# Patient Record
Sex: FEMALE | Race: WHITE | NOT HISPANIC OR LATINO | Employment: FULL TIME | RURAL
[De-identification: names, ages, dates, MRNs, and addresses within clinical notes are randomized per-mention and may not be internally consistent; named-entity substitution may affect disease eponyms.]

---

## 2019-09-27 ENCOUNTER — OFFICE VISIT (OUTPATIENT)
Dept: FAMILY MEDICINE CLINIC | Facility: CLINIC | Age: 29
End: 2019-09-27

## 2019-09-27 VITALS
TEMPERATURE: 97.4 F | HEIGHT: 64 IN | WEIGHT: 131 LBS | RESPIRATION RATE: 18 BRPM | BODY MASS INDEX: 22.36 KG/M2 | SYSTOLIC BLOOD PRESSURE: 104 MMHG | OXYGEN SATURATION: 98 % | DIASTOLIC BLOOD PRESSURE: 66 MMHG | HEART RATE: 88 BPM

## 2019-09-27 DIAGNOSIS — F41.9 ANXIETY: Primary | ICD-10-CM

## 2019-09-27 DIAGNOSIS — J30.1 SEASONAL ALLERGIC RHINITIS DUE TO POLLEN: ICD-10-CM

## 2019-09-27 DIAGNOSIS — M79.671 HEEL PAIN, BILATERAL: ICD-10-CM

## 2019-09-27 DIAGNOSIS — M79.672 HEEL PAIN, BILATERAL: ICD-10-CM

## 2019-09-27 PROBLEM — T78.40XA ALLERGIC DRUG REACTION: Status: ACTIVE | Noted: 2019-09-27

## 2019-09-27 PROBLEM — N32.89: Status: ACTIVE | Noted: 2019-09-27

## 2019-09-27 PROBLEM — F81.89 OTHER SPECIFIC DEVELOPMENTAL LEARNING DIFFICULTIES: Status: ACTIVE | Noted: 2019-09-27

## 2019-09-27 PROBLEM — F43.0 ACUTE REACTION TO STRESS: Status: ACTIVE | Noted: 2019-09-27

## 2019-09-27 PROBLEM — R79.82 ELEVATED C-REACTIVE PROTEIN: Status: ACTIVE | Noted: 2019-09-27

## 2019-09-27 PROBLEM — T78.40XA ALLERGIC DRUG REACTION: Status: RESOLVED | Noted: 2019-09-27 | Resolved: 2019-09-27

## 2019-09-27 PROBLEM — N13.70 VESICOURETERAL REFLUX: Status: ACTIVE | Noted: 2019-09-27

## 2019-09-27 PROBLEM — N60.19 FIBROCYSTIC BREAST DISEASE: Status: ACTIVE | Noted: 2019-09-27

## 2019-09-27 PROCEDURE — 99214 OFFICE O/P EST MOD 30 MIN: CPT | Performed by: FAMILY MEDICINE

## 2019-09-27 RX ORDER — BUSPIRONE HYDROCHLORIDE 7.5 MG/1
7.5 TABLET ORAL TAKE AS DIRECTED
Qty: 53 TABLET | Refills: 1 | Status: SHIPPED | OUTPATIENT
Start: 2019-09-27 | End: 2019-11-08 | Stop reason: SDUPTHER

## 2019-09-27 RX ORDER — BUSPIRONE HYDROCHLORIDE 7.5 MG/1
7.5 TABLET ORAL TAKE AS DIRECTED
Qty: 53 TABLET | Refills: 0 | Status: SHIPPED | OUTPATIENT
Start: 2019-09-27 | End: 2019-09-27 | Stop reason: SDUPTHER

## 2019-09-27 NOTE — PROGRESS NOTES
Subjective   Evangelist Mosher is a 29 y.o. female.     Chief Complaint   Patient presents with   • Anxiety   • Foot Pain     bilateral heel pain for years       Anxiety   Presents for follow-up visit. Symptoms include nervous/anxious behavior. Patient reports no chest pain, depressed mood, nausea or shortness of breath. Primary symptoms comment: in nursing school. Symptoms occur most days. The severity of symptoms is interfering with daily activities. The quality of sleep is good. Nighttime awakenings: none.       Foot Pain   This is a chronic problem. The current episode started more than 1 year ago. The problem occurs constantly. The problem has been unchanged. Pertinent negatives include no abdominal pain, arthralgias, chest pain, coughing, fever, nausea, rash or vomiting. The symptoms are aggravated by walking. She has tried nothing for the symptoms.        The following portions of the patient's history were reviewed and updated as appropriate: allergies, current medications, past family history, past medical history, past social history, past surgical history and problem list.    Allergies:  Allergies   Allergen Reactions   • Ceftriaxone Hives, Itching, Rash and Swelling   • Sulfa Antibiotics Hives   • Ciprofloxacin Hives       Social History:  Social History     Socioeconomic History   • Marital status: Single     Spouse name: Not on file   • Number of children: Not on file   • Years of education: Not on file   • Highest education level: Not on file   Tobacco Use   • Smoking status: Never Smoker   • Smokeless tobacco: Never Used   Substance and Sexual Activity   • Alcohol use: No     Frequency: Never   • Drug use: No       Family History:  Family History   Problem Relation Age of Onset   • Diabetes Maternal Grandfather         mellitus   • Heart disease Maternal Grandfather         ischemic   • Cancer Paternal Grandfather    • Heart disease Paternal Grandfather         ischemic   • Bipolar disorder Cousin   "      Past Medical History :  Patient Active Problem List   Diagnosis   • Acute reaction to stress   • Anxiety   • Elevated C-reactive protein   • Fibrocystic breast disease   • History of varicella   • Other bladder disorder   • Other specific developmental learning difficulties   • Seasonal allergic rhinitis due to pollen   • Vesicoureteral reflux       Medication List:    Current Outpatient Medications:   •  busPIRone (BUSPAR) 7.5 MG tablet, Take 1 tablet by mouth Take As Directed. 1 daily for 7 days, then BID., Disp: 53 tablet, Rfl: 1    Past Surgical History:  History reviewed. No pertinent surgical history.    Review of Systems:  Review of Systems   Constitutional: Negative for activity change and fever.   HENT: Negative for ear pain, rhinorrhea, sinus pressure and voice change.    Eyes: Negative for visual disturbance.   Respiratory: Negative for cough and shortness of breath.    Cardiovascular: Negative for chest pain.   Gastrointestinal: Negative for abdominal pain, diarrhea, nausea and vomiting.   Endocrine: Negative for cold intolerance and heat intolerance.   Genitourinary: Negative for frequency and urgency.   Musculoskeletal: Negative for arthralgias.   Skin: Negative for rash.   Neurological: Negative for syncope.   Hematological: Does not bruise/bleed easily.   Psychiatric/Behavioral: Negative for depressed mood. The patient is nervous/anxious.        Physical Exam:  Vital Signs:  Visit Vitals  /66   Pulse 88   Temp 97.4 °F (36.3 °C)   Resp 18   Ht 161.9 cm (63.75\")   Wt 59.4 kg (131 lb)   LMP 09/21/2019   SpO2 98%   BMI 22.66 kg/m²       Physical Exam   Constitutional: She is oriented to person, place, and time. She appears well-developed and well-nourished. She is cooperative.   Cardiovascular: Normal rate, regular rhythm and normal heart sounds. Exam reveals no gallop and no friction rub.   No murmur heard.  Pulmonary/Chest: Effort normal and breath sounds normal. She has no wheezes. She has " no rales.        Neurological: She is alert and oriented to person, place, and time. Coordination normal.   Skin: Skin is warm and dry.   Psychiatric: She has a normal mood and affect.   Vitals reviewed.      Assessment and Plan:  Problem List Items Addressed This Visit        Respiratory    Seasonal allergic rhinitis due to pollen    Overview     contributing to symptoms            Other    Anxiety - Primary    Relevant Medications    busPIRone (BUSPAR) 7.5 MG tablet      Other Visit Diagnoses     Heel pain, bilateral              An After Visit Summary and PPPS were given to the patient.

## 2019-11-08 ENCOUNTER — OFFICE VISIT (OUTPATIENT)
Dept: FAMILY MEDICINE CLINIC | Facility: CLINIC | Age: 29
End: 2019-11-08

## 2019-11-08 VITALS
HEART RATE: 66 BPM | SYSTOLIC BLOOD PRESSURE: 108 MMHG | TEMPERATURE: 98.6 F | BODY MASS INDEX: 23.56 KG/M2 | OXYGEN SATURATION: 98 % | DIASTOLIC BLOOD PRESSURE: 62 MMHG | RESPIRATION RATE: 18 BRPM | WEIGHT: 138 LBS | HEIGHT: 64 IN

## 2019-11-08 DIAGNOSIS — B00.1 COLD SORE: ICD-10-CM

## 2019-11-08 DIAGNOSIS — F41.9 ANXIETY: Primary | ICD-10-CM

## 2019-11-08 PROCEDURE — 99212 OFFICE O/P EST SF 10 MIN: CPT | Performed by: FAMILY MEDICINE

## 2019-11-08 RX ORDER — ACYCLOVIR 200 MG/1
200 CAPSULE ORAL
Qty: 25 CAPSULE | Refills: 1 | Status: SHIPPED | OUTPATIENT
Start: 2019-11-08 | End: 2020-09-14

## 2019-11-08 RX ORDER — BUSPIRONE HYDROCHLORIDE 7.5 MG/1
7.5 TABLET ORAL TAKE AS DIRECTED
Qty: 60 TABLET | Refills: 12 | Status: SHIPPED | OUTPATIENT
Start: 2019-11-08 | End: 2020-09-14 | Stop reason: DRUGHIGH

## 2019-11-08 NOTE — PROGRESS NOTES
Subjective   Evangelist Mosher is a 29 y.o. female.     Chief Complaint   Patient presents with   • Anxiety       Doing really good on medication.       Anxiety   Presents for follow-up visit. Symptoms include nervous/anxious behavior. Patient reports no chest pain, depressed mood, nausea or shortness of breath. Symptoms occur occasionally. The severity of symptoms is mild. The quality of sleep is good. Nighttime awakenings: occasional.     Compliance with medications is %. Treatment side effects: none.      She has a cold sore on her right upper lip. Been there a few days. Getting worse. Nothing makes it better. It tingles. She has been stressed    The following portions of the patient's history were reviewed and updated as appropriate: allergies, current medications, past family history, past medical history, past social history, past surgical history and problem list.    Allergies:  Allergies   Allergen Reactions   • Ceftriaxone Hives, Itching, Rash and Swelling   • Sulfa Antibiotics Hives   • Ciprofloxacin Hives       Social History:  Social History     Socioeconomic History   • Marital status: Single     Spouse name: Not on file   • Number of children: Not on file   • Years of education: Not on file   • Highest education level: Not on file   Tobacco Use   • Smoking status: Never Smoker   • Smokeless tobacco: Never Used   Substance and Sexual Activity   • Alcohol use: No     Frequency: Never   • Drug use: No       Family History:  Family History   Problem Relation Age of Onset   • Diabetes Maternal Grandfather         mellitus   • Heart disease Maternal Grandfather         ischemic   • Cancer Paternal Grandfather    • Heart disease Paternal Grandfather         ischemic   • Bipolar disorder Cousin        Past Medical History :  Patient Active Problem List   Diagnosis   • Acute reaction to stress   • Anxiety   • Elevated C-reactive protein   • Fibrocystic breast disease   • History of varicella   • Other  "bladder disorder   • Other specific developmental learning difficulties   • Seasonal allergic rhinitis due to pollen   • Vesicoureteral reflux       Medication List:    Current Outpatient Medications:   •  busPIRone (BUSPAR) 7.5 MG tablet, Take 1 tablet by mouth Take As Directed. 1 daily for 7 days, then BID., Disp: 60 tablet, Rfl: 12  •  acyclovir (ZOVIRAX) 200 MG capsule, Take 1 capsule by mouth Every 4 (Four) Hours While Awake. Take at first sign of recurrence., Disp: 25 capsule, Rfl: 1    Past Surgical History:  History reviewed. No pertinent surgical history.    Review of Systems:  Review of Systems   Constitutional: Negative for activity change and fever.   HENT: Negative for ear pain, rhinorrhea, sinus pressure and voice change.    Eyes: Negative for visual disturbance.   Respiratory: Negative for cough and shortness of breath.    Cardiovascular: Negative for chest pain.   Gastrointestinal: Negative for abdominal pain, diarrhea, nausea and vomiting.   Endocrine: Negative for cold intolerance and heat intolerance.   Genitourinary: Negative for frequency and urgency.   Musculoskeletal: Negative for arthralgias.   Skin: Positive for skin lesions. Negative for rash.   Neurological: Negative for syncope.   Hematological: Does not bruise/bleed easily.   Psychiatric/Behavioral: Negative for depressed mood. The patient is nervous/anxious.        Physical Exam:  Vital Signs:  Visit Vitals  /62   Pulse 66   Temp 98.6 °F (37 °C)   Resp 18   Ht 161.9 cm (63.75\")   Wt 62.6 kg (138 lb)   SpO2 98%   BMI 23.87 kg/m²       Physical Exam   Constitutional: She appears well-developed and well-nourished.   HENT:   Head: Normocephalic and atraumatic.   Right Ear: External ear normal. Tympanic membrane is not injected, not erythematous, not retracted and not bulging. No middle ear effusion.   Left Ear: External ear normal. Tympanic membrane is not injected, not erythematous, not retracted and not bulging.  No middle ear " effusion.   Nose: Nose normal. No rhinorrhea.   Mouth/Throat: Oropharynx is clear and moist. Oral lesions present. No oropharyngeal exudate.   Right upper lip, vesicles on upp lip   Cardiovascular: Regular rhythm and normal heart sounds. Exam reveals no gallop and no friction rub.   No murmur heard.  Pulmonary/Chest: Effort normal and breath sounds normal. No respiratory distress. She has no wheezes. She has no rales.   Lymphadenopathy:     She has no cervical adenopathy.   Neurological: She is alert.   Skin: Skin is warm and dry.   Vitals reviewed.      Assessment and Plan:  Problem List Items Addressed This Visit        Other    Anxiety - Primary    Overview     buspar is helping  Will continue current treament         Relevant Medications    busPIRone (BUSPAR) 7.5 MG tablet      Other Visit Diagnoses     Cold sore        Relevant Medications    acyclovir (ZOVIRAX) 200 MG capsule      Good social support, no suicidal or homicidal ideation. Diagnosis and treatment discussed.     An After Visit Summary and PPPS were given to the patient.

## 2019-11-14 ENCOUNTER — TELEPHONE (OUTPATIENT)
Dept: FAMILY MEDICINE CLINIC | Facility: CLINIC | Age: 29
End: 2019-11-14

## 2019-11-14 NOTE — TELEPHONE ENCOUNTER
She wanted to know if it would be ok to use the acyclovir for the blister that she has from her new tatoo?

## 2020-03-17 ENCOUNTER — OFFICE VISIT (OUTPATIENT)
Dept: FAMILY MEDICINE CLINIC | Facility: CLINIC | Age: 30
End: 2020-03-17

## 2020-03-17 VITALS
WEIGHT: 139 LBS | HEIGHT: 63 IN | RESPIRATION RATE: 16 BRPM | HEART RATE: 110 BPM | TEMPERATURE: 98.8 F | OXYGEN SATURATION: 99 % | DIASTOLIC BLOOD PRESSURE: 70 MMHG | BODY MASS INDEX: 24.63 KG/M2 | SYSTOLIC BLOOD PRESSURE: 110 MMHG

## 2020-03-17 DIAGNOSIS — J01.00 ACUTE NON-RECURRENT MAXILLARY SINUSITIS: Primary | ICD-10-CM

## 2020-03-17 PROCEDURE — 99213 OFFICE O/P EST LOW 20 MIN: CPT | Performed by: FAMILY MEDICINE

## 2020-03-17 RX ORDER — AMOXICILLIN 500 MG/1
500 TABLET, FILM COATED ORAL 3 TIMES DAILY
Qty: 30 TABLET | Refills: 0 | Status: SHIPPED | OUTPATIENT
Start: 2020-03-17 | End: 2020-06-04

## 2020-03-17 RX ORDER — IBUPROFEN 800 MG/1
800 TABLET ORAL EVERY 6 HOURS PRN
COMMUNITY
End: 2020-09-14 | Stop reason: SDUPTHER

## 2020-03-17 RX ORDER — NYSTATIN AND TRIAMCINOLONE ACETONIDE 100000; 1 [USP'U]/G; MG/G
OINTMENT TOPICAL 2 TIMES DAILY PRN
COMMUNITY
Start: 2019-12-16 | End: 2021-10-25

## 2020-03-17 RX ORDER — LIDOCAINE AND PRILOCAINE 25; 25 MG/G; MG/G
CREAM TOPICAL ONCE
COMMUNITY
End: 2020-06-04

## 2020-03-17 NOTE — PROGRESS NOTES
Subjective   Evangelist Mosher is a 30 y.o. female.     Chief Complaint   Patient presents with   • Earache       Earache    There is pain in the left ear. This is a new problem. The current episode started 1 to 4 weeks ago. The problem occurs every few hours. The problem has been unchanged. There has been no fever. The pain is moderate. Associated symptoms include a sore throat. Pertinent negatives include no abdominal pain, coughing, diarrhea, rash, rhinorrhea or vomiting. She has tried NSAIDs (allegra D) for the symptoms. The treatment provided no relief.        The following portions of the patient's history were reviewed and updated as appropriate: allergies, current medications, past family history, past medical history, past social history, past surgical history and problem list.    Allergies:  Allergies   Allergen Reactions   • Ceftriaxone Hives, Itching, Rash and Swelling   • Sulfa Antibiotics Hives   • Ciprofloxacin Hives       Social History:  Social History     Socioeconomic History   • Marital status: Single     Spouse name: Not on file   • Number of children: Not on file   • Years of education: Not on file   • Highest education level: Not on file   Tobacco Use   • Smoking status: Never Smoker   • Smokeless tobacco: Never Used   Substance and Sexual Activity   • Alcohol use: No     Frequency: Never   • Drug use: No       Family History:  Family History   Problem Relation Age of Onset   • Diabetes Maternal Grandfather         mellitus   • Heart disease Maternal Grandfather         ischemic   • Cancer Paternal Grandfather    • Heart disease Paternal Grandfather         ischemic   • Bipolar disorder Cousin        Past Medical History :  Patient Active Problem List   Diagnosis   • Acute reaction to stress   • Anxiety   • Elevated C-reactive protein   • Fibrocystic breast disease   • History of varicella   • Other bladder disorder   • Other specific developmental learning difficulties   • Seasonal allergic  rhinitis due to pollen   • Vesicoureteral reflux       Medication List:  Outpatient Encounter Medications as of 3/17/2020   Medication Sig Dispense Refill   • busPIRone (BUSPAR) 7.5 MG tablet Take 1 tablet by mouth Take As Directed. 1 daily for 7 days, then BID. 60 tablet 12   • ibuprofen (ADVIL,MOTRIN) 800 MG tablet Take 800 mg by mouth Every 6 (Six) Hours As Needed for Mild Pain .     • lidocaine-prilocaine (EMLA) 2.5-2.5 % cream Apply  topically to the appropriate area as directed 1 (One) Time.     • nystatin-triamcinolone (MYCOLOG) 240106-8.1 UNIT/GM-% ointment 2 (Two) Times a Day As Needed.     • acyclovir (ZOVIRAX) 200 MG capsule Take 1 capsule by mouth Every 4 (Four) Hours While Awake. Take at first sign of recurrence. 25 capsule 1   • amoxicillin (AMOXIL) 500 MG tablet Take 1 tablet by mouth 3 (Three) Times a Day. 30 tablet 0     No facility-administered encounter medications on file as of 3/17/2020.        Past Surgical History:  History reviewed. No pertinent surgical history.    Review of Systems:  Review of Systems   Constitutional: Negative for activity change and fever.   HENT: Positive for ear pain and sore throat. Negative for rhinorrhea, sinus pressure and voice change.    Eyes: Negative for visual disturbance.   Respiratory: Negative for cough and shortness of breath.    Cardiovascular: Negative for chest pain.   Gastrointestinal: Negative for abdominal pain, diarrhea, nausea and vomiting.   Endocrine: Negative for cold intolerance and heat intolerance.   Genitourinary: Negative for frequency and urgency.   Musculoskeletal: Negative for arthralgias.   Skin: Negative for rash.   Neurological: Negative for syncope.   Hematological: Does not bruise/bleed easily.   Psychiatric/Behavioral: Negative for depressed mood. The patient is not nervous/anxious.        I have reviewed and confirmed the accuracy of the ROS as documented by the MA/LPN/RN Tanja Caro MD    Vital Signs:  Visit Vitals  /70  "  Pulse 110   Temp 98.8 °F (37.1 °C)   Resp 16   Ht 160 cm (63\")   Wt 63 kg (139 lb)   LMP 02/15/2020 (Exact Date)   SpO2 99%   BMI 24.62 kg/m²       Physical Exam   Constitutional: She is oriented to person, place, and time. She appears well-developed and well-nourished. She is cooperative.   HENT:   Head: Normocephalic and atraumatic.   Right Ear: External ear normal. Tympanic membrane is not injected, not erythematous, not retracted and not bulging. No middle ear effusion.   Left Ear: External ear normal. Tympanic membrane is not injected, not erythematous, not retracted and not bulging.  No middle ear effusion.   Nose: Nose normal. No rhinorrhea.   Mouth/Throat: Oropharynx is clear and moist. No oropharyngeal exudate.   Cardiovascular: Normal rate, regular rhythm and normal heart sounds. Exam reveals no gallop and no friction rub.   No murmur heard.  Pulmonary/Chest: Effort normal and breath sounds normal. No respiratory distress. She has no wheezes. She has no rales.   Lymphadenopathy:     She has no cervical adenopathy.   Neurological: She is alert and oriented to person, place, and time. Coordination normal.   Skin: Skin is warm and dry.   Psychiatric: She has a normal mood and affect.   Vitals reviewed.      Assessment and Plan:  Problem List Items Addressed This Visit     None      Visit Diagnoses     Acute non-recurrent maxillary sinusitis    -  Primary    Relevant Medications    amoxicillin (AMOXIL) 500 MG tablet        increase fluids, tylenol for fever, motrin for pain. Humidifier to help with congestion and to sleep at night. Dicussed OTC meds, gargle with warm salt water. If there is recurrent fever, shortness of breath, lethargy, advised to come in to the office or go to the ER.      An After Visit Summary and PPPS were given to the patient.     "

## 2020-06-04 ENCOUNTER — OFFICE VISIT (OUTPATIENT)
Dept: FAMILY MEDICINE CLINIC | Facility: CLINIC | Age: 30
End: 2020-06-04

## 2020-06-04 VITALS
HEIGHT: 63 IN | DIASTOLIC BLOOD PRESSURE: 68 MMHG | RESPIRATION RATE: 16 BRPM | TEMPERATURE: 98.3 F | HEART RATE: 108 BPM | WEIGHT: 142.6 LBS | BODY MASS INDEX: 25.27 KG/M2 | SYSTOLIC BLOOD PRESSURE: 112 MMHG | OXYGEN SATURATION: 98 %

## 2020-06-04 DIAGNOSIS — L74.0 MILIARIA RUBRA: Primary | ICD-10-CM

## 2020-06-04 PROBLEM — R21 GENERALIZED RASH: Status: ACTIVE | Noted: 2020-06-04

## 2020-06-04 PROCEDURE — 99213 OFFICE O/P EST LOW 20 MIN: CPT | Performed by: FAMILY MEDICINE

## 2020-06-04 RX ORDER — PREDNISONE 1 MG/1
5 TABLET ORAL DAILY
Qty: 45 TABLET | Refills: 0 | Status: SHIPPED | OUTPATIENT
Start: 2020-06-04 | End: 2020-09-14

## 2020-06-04 RX ORDER — ETONOGESTREL AND ETHINYL ESTRADIOL 11.7; 2.7 MG/1; MG/1
1 INSERT, EXTENDED RELEASE VAGINAL
COMMUNITY
End: 2020-09-14

## 2020-06-04 NOTE — PROGRESS NOTES
Subjective   Evangelist Mosher is a 30 y.o. female.     Chief Complaint   Patient presents with   • Rash       Rash   This is a new problem. The current episode started in the past 7 days. The problem has been gradually worsening since onset. The affected locations include the left arm, right arm, left lower leg, right lower leg, right hand, right elbow, left elbow, left hand and left shoulder. The rash is characterized by redness. She was exposed to nothing. Pertinent negatives include no cough, diarrhea, fever, rhinorrhea, shortness of breath or vomiting. Past treatments include nothing. The treatment provided no relief.        The following portions of the patient's history were reviewed and updated as appropriate: allergies, current medications, past family history, past medical history, past social history, past surgical history and problem list.    Allergies:  Allergies   Allergen Reactions   • Ceftriaxone Hives, Itching, Rash and Swelling   • Sulfa Antibiotics Hives   • Ciprofloxacin Hives       Social History:  Social History     Socioeconomic History   • Marital status: Single     Spouse name: Not on file   • Number of children: Not on file   • Years of education: Not on file   • Highest education level: Not on file   Tobacco Use   • Smoking status: Never Smoker   • Smokeless tobacco: Never Used   Substance and Sexual Activity   • Alcohol use: No     Frequency: Never   • Drug use: No       Family History:  Family History   Problem Relation Age of Onset   • Diabetes Maternal Grandfather         mellitus   • Heart disease Maternal Grandfather         ischemic   • Cancer Paternal Grandfather    • Heart disease Paternal Grandfather         ischemic   • Bipolar disorder Cousin        Past Medical History :  Patient Active Problem List   Diagnosis   • Acute reaction to stress   • Anxiety   • Elevated C-reactive protein   • Fibrocystic breast disease   • History of varicella   • Other bladder disorder   • Other  specific developmental learning difficulties   • Seasonal allergic rhinitis due to pollen   • Vesicoureteral reflux   • Miliaria rubra       Medication List:    Current Outpatient Medications:   •  acyclovir (ZOVIRAX) 200 MG capsule, Take 1 capsule by mouth Every 4 (Four) Hours While Awake. Take at first sign of recurrence., Disp: 25 capsule, Rfl: 1  •  busPIRone (BUSPAR) 7.5 MG tablet, Take 1 tablet by mouth Take As Directed. 1 daily for 7 days, then BID., Disp: 60 tablet, Rfl: 12  •  etonogestrel-ethinyl estradiol (NuvaRing) 0.12-0.015 MG/24HR vaginal ring, Insert 1 each into the vagina Every 28 (Twenty-Eight) Days. Insert vaginally and leave in place for 3 consecutive weeks, then remove for 1 week., Disp: , Rfl:   •  ibuprofen (ADVIL,MOTRIN) 800 MG tablet, Take 800 mg by mouth Every 6 (Six) Hours As Needed for Mild Pain ., Disp: , Rfl:   •  nystatin-triamcinolone (MYCOLOG) 992232-9.1 UNIT/GM-% ointment, 2 (Two) Times a Day As Needed., Disp: , Rfl:   •  predniSONE (DELTASONE) 5 MG tablet, Take 1 tablet by mouth Daily. 40mg x 3 days, 20mg x 3 days, 10mg x 3 days, 5mg x 3 days, Disp: 45 tablet, Rfl: 0    Past Surgical History:  History reviewed. No pertinent surgical history.    Review of Systems:  Review of Systems   Constitutional: Negative for activity change and fever.   HENT: Negative for ear pain, rhinorrhea, sinus pressure and voice change.    Eyes: Negative for visual disturbance.   Respiratory: Negative for cough and shortness of breath.    Cardiovascular: Negative for chest pain.   Gastrointestinal: Negative for abdominal pain, diarrhea, nausea and vomiting.   Endocrine: Negative for cold intolerance and heat intolerance.   Genitourinary: Negative for frequency and urgency.   Musculoskeletal: Negative for arthralgias.   Skin: Positive for rash.   Neurological: Negative for syncope.   Hematological: Does not bruise/bleed easily.   Psychiatric/Behavioral: Negative for depressed mood. The patient is not  "nervous/anxious.        Physical Exam:  Vital Signs:  Visit Vitals  /68 (BP Location: Left arm)   Pulse 108   Temp 98.3 °F (36.8 °C) (Oral)   Resp 16   Ht 160 cm (63\")   Wt 64.7 kg (142 lb 9.6 oz)   LMP 05/09/2020 (Exact Date)   SpO2 98%   BMI 25.26 kg/m²       Physical Exam   Constitutional: She is oriented to person, place, and time. She appears well-developed and well-nourished. She is cooperative.   Cardiovascular: Normal rate, regular rhythm and normal heart sounds. Exam reveals no gallop and no friction rub.   No murmur heard.  Pulmonary/Chest: Effort normal and breath sounds normal. She has no wheezes. She has no rales.   Neurological: She is alert and oriented to person, place, and time. Coordination normal.   Skin: Skin is warm and dry.   Small pink papules generalized on abdomen and back. Arms and legs   Psychiatric: She has a normal mood and affect.   Vitals reviewed.      Assessment and Plan:  Problem List Items Addressed This Visit        Musculoskeletal and Integument    Miliaria rubra - Primary    Overview     Discussed staying cool, wearing sunscreen.   Discussed risks of steroids: hyperglycemia, osteoporosis, avascular necrosis, anxiety, insomnia and cataracts. Patient states understanding           Relevant Medications    predniSONE (DELTASONE) 5 MG tablet          An After Visit Summary and PPPS were given to the patient.             I wore protective equipment throughout this patient encounter to include mask, gloves and eye protection. Hand hygiene was performed before donning protective equipment and after removal when leaving the room.   "

## 2020-09-14 ENCOUNTER — OFFICE VISIT (OUTPATIENT)
Dept: FAMILY MEDICINE CLINIC | Facility: CLINIC | Age: 30
End: 2020-09-14

## 2020-09-14 VITALS
HEART RATE: 108 BPM | TEMPERATURE: 98.4 F | RESPIRATION RATE: 16 BRPM | BODY MASS INDEX: 24.73 KG/M2 | HEIGHT: 63 IN | SYSTOLIC BLOOD PRESSURE: 106 MMHG | DIASTOLIC BLOOD PRESSURE: 66 MMHG | WEIGHT: 139.6 LBS | OXYGEN SATURATION: 99 %

## 2020-09-14 DIAGNOSIS — J30.1 SEASONAL ALLERGIC RHINITIS DUE TO POLLEN: Primary | ICD-10-CM

## 2020-09-14 DIAGNOSIS — L29.9 PRURITUS: ICD-10-CM

## 2020-09-14 DIAGNOSIS — L29.9 ITCHY SCALP: ICD-10-CM

## 2020-09-14 PROBLEM — IMO0002 BORDERLINE OPEN-ANGLE GLAUCOMA: Status: ACTIVE | Noted: 2019-09-06

## 2020-09-14 PROCEDURE — 99213 OFFICE O/P EST LOW 20 MIN: CPT | Performed by: FAMILY MEDICINE

## 2020-09-14 RX ORDER — BUSPIRONE HYDROCHLORIDE 10 MG/1
7.5 TABLET ORAL
COMMUNITY
End: 2020-10-12 | Stop reason: DRUGHIGH

## 2020-09-14 RX ORDER — IBUPROFEN 800 MG/1
800 TABLET ORAL EVERY 8 HOURS PRN
Qty: 90 TABLET | Refills: 3 | Status: SHIPPED | OUTPATIENT
Start: 2020-09-14 | End: 2021-05-27

## 2020-09-14 RX ORDER — IBUPROFEN 800 MG/1
800 TABLET ORAL EVERY 6 HOURS PRN
Qty: 30 TABLET | Refills: 3 | Status: SHIPPED | OUTPATIENT
Start: 2020-09-14 | End: 2020-09-14

## 2020-09-14 NOTE — PROGRESS NOTES
Subjective   Evangelist Mosher is a 30 y.o. female.     Chief Complaint   Patient presents with   • Rash       Rash  This is a new problem. The current episode started 1 to 4 weeks ago. The problem is unchanged. The affected locations include the scalp, back, left shoulder and right shoulder. The rash is characterized by burning and itchiness. It is unknown if there was an exposure to a precipitant. Pertinent negatives include no cough, diarrhea, facial edema, fatigue, fever, joint pain, rhinorrhea, shortness of breath, sore throat or vomiting. Past treatments include antihistamine. The treatment provided no relief.        The following portions of the patient's history were reviewed and updated as appropriate: allergies, current medications, past family history, past medical history, past social history, past surgical history and problem list.    Allergies:  Allergies   Allergen Reactions   • Ceftriaxone Hives, Itching, Rash and Swelling   • Sulfa Antibiotics Hives   • Ciprofloxacin Hives       Social History:  Social History     Socioeconomic History   • Marital status: Single     Spouse name: Not on file   • Number of children: Not on file   • Years of education: Not on file   • Highest education level: Not on file   Tobacco Use   • Smoking status: Never Smoker   • Smokeless tobacco: Never Used   Substance and Sexual Activity   • Alcohol use: No     Frequency: Never   • Drug use: No       Family History:  Family History   Problem Relation Age of Onset   • Diabetes Maternal Grandfather         mellitus   • Heart disease Maternal Grandfather         ischemic   • Cancer Paternal Grandfather    • Heart disease Paternal Grandfather         ischemic   • Bipolar disorder Cousin        Past Medical History :  Patient Active Problem List   Diagnosis   • Acute reaction to stress   • Anxiety   • Elevated C-reactive protein   • Fibrocystic breast disease   • History of varicella   • Other bladder disorder   • Other specific  "developmental learning difficulties   • Seasonal allergic rhinitis due to pollen   • Vesicoureteral reflux   • Miliaria rubra   • Borderline open-angle glaucoma   • Itchy scalp   • Pruritus       Medication List:    Current Outpatient Medications:   •  ibuprofen (ADVIL,MOTRIN) 800 MG tablet, Take 1 tablet by mouth Every 8 (Eight) Hours As Needed for Mild Pain ., Disp: 90 tablet, Rfl: 3  •  nystatin-triamcinolone (MYCOLOG) 583646-8.1 UNIT/GM-% ointment, 2 (Two) Times a Day As Needed., Disp: , Rfl:   •  busPIRone (BUSPAR) 10 MG tablet, , Disp: , Rfl:     Past Surgical History:  History reviewed. No pertinent surgical history.    Review of Systems:  Review of Systems   Constitutional: Negative for activity change, fatigue and fever.   HENT: Negative for ear pain, rhinorrhea, sinus pressure, sore throat and voice change.    Eyes: Negative for visual disturbance.   Respiratory: Negative for cough and shortness of breath.    Cardiovascular: Negative for chest pain.   Gastrointestinal: Negative for abdominal pain, diarrhea, nausea and vomiting.   Endocrine: Negative for cold intolerance and heat intolerance.   Genitourinary: Negative for frequency and urgency.   Musculoskeletal: Negative for arthralgias and joint pain.   Skin: Positive for rash.   Neurological: Negative for syncope.   Hematological: Does not bruise/bleed easily.   Psychiatric/Behavioral: Negative for depressed mood. The patient is not nervous/anxious.        Physical Exam:  Vital Signs:  Visit Vitals  /66 (BP Location: Left arm)   Pulse 108   Temp 98.4 °F (36.9 °C) (Temporal)   Resp 16   Ht 160 cm (63\")   Wt 63.3 kg (139 lb 9.6 oz)   LMP 09/12/2020 (Exact Date)   SpO2 99%   BMI 24.73 kg/m²       Physical Exam  Vitals signs reviewed.   Constitutional:       Appearance: She is well-developed.   Eyes:      General:         Right eye: No discharge.         Left eye: No discharge.   Cardiovascular:      Rate and Rhythm: Normal rate and regular rhythm.     "  Heart sounds: Normal heart sounds. No murmur. No friction rub. No gallop.    Pulmonary:      Effort: Pulmonary effort is normal. No respiratory distress.      Breath sounds: Normal breath sounds. No wheezing or rales.   Skin:     General: Skin is warm and dry.      Findings: No rash.   Neurological:      Mental Status: She is alert and oriented to person, place, and time.         Assessment and Plan:  Problem List Items Addressed This Visit        Respiratory    Seasonal allergic rhinitis due to pollen - Primary    Overview     contributing to symptoms         Relevant Medications    ibuprofen (ADVIL,MOTRIN) 800 MG tablet       Nervous and Auditory    Itchy scalp    Pruritus    Current Assessment & Plan     No rash.   Skin is dry and itchy.  Discussed moisturizing and no hot showers. Start anithistamines like zyrtec            stress vs dry skin vs allergies. Discussed moisturizing shampoo, skin. Take zyrtec daily    An After Visit Summary and PPPS were given to the patient.             I wore protective equipment throughout this patient encounter to include mask, gloves and eye protection. Hand hygiene was performed before donning protective equipment and after removal when leaving the room.

## 2020-09-20 NOTE — ASSESSMENT & PLAN NOTE
No rash.   Skin is dry and itchy.  Discussed moisturizing and no hot showers. Start anithistamines like zyrtec

## 2020-10-12 ENCOUNTER — OFFICE VISIT (OUTPATIENT)
Dept: FAMILY MEDICINE CLINIC | Facility: CLINIC | Age: 30
End: 2020-10-12

## 2020-10-12 VITALS
HEART RATE: 102 BPM | RESPIRATION RATE: 16 BRPM | TEMPERATURE: 98.6 F | DIASTOLIC BLOOD PRESSURE: 70 MMHG | BODY MASS INDEX: 25.87 KG/M2 | HEIGHT: 63 IN | SYSTOLIC BLOOD PRESSURE: 114 MMHG | OXYGEN SATURATION: 99 % | WEIGHT: 146 LBS

## 2020-10-12 DIAGNOSIS — L29.9 PRURITUS: Primary | ICD-10-CM

## 2020-10-12 DIAGNOSIS — L29.9 ITCHY SCALP: ICD-10-CM

## 2020-10-12 PROCEDURE — 99213 OFFICE O/P EST LOW 20 MIN: CPT | Performed by: FAMILY MEDICINE

## 2020-10-12 RX ORDER — MONTELUKAST SODIUM 10 MG/1
10 TABLET ORAL NIGHTLY
COMMUNITY
End: 2022-01-06

## 2020-10-12 RX ORDER — BUSPIRONE HYDROCHLORIDE 7.5 MG/1
TABLET ORAL
COMMUNITY
Start: 2020-09-22 | End: 2021-02-01

## 2020-10-12 RX ORDER — FEXOFENADINE HCL AND PSEUDOEPHEDRINE HCI 180; 240 MG/1; MG/1
1 TABLET, EXTENDED RELEASE ORAL DAILY
COMMUNITY

## 2020-10-12 RX ORDER — CETIRIZINE HYDROCHLORIDE 10 MG/1
10 TABLET ORAL DAILY
COMMUNITY
End: 2021-10-25

## 2020-10-12 NOTE — PROGRESS NOTES
Subjective   Evangelist Mosher is a 30 y.o. female.     Chief Complaint   Patient presents with   • Rash       Rash  This is a new problem. The current episode started 1 to 4 weeks ago. The problem has been resolved since onset. The affected locations include the scalp, back, left shoulder and right shoulder. The rash is characterized by burning and itchiness. It is unknown if there was an exposure to a precipitant. Pertinent negatives include no cough, diarrhea, facial edema, fatigue, fever, joint pain, rhinorrhea, shortness of breath, sore throat or vomiting. Past treatments include antihistamine and antibiotic cream. The treatment provided significant relief.        The following portions of the patient's history were reviewed and updated as appropriate: allergies, current medications, past family history, past medical history, past social history, past surgical history and problem list.    Allergies:  Allergies   Allergen Reactions   • Ceftriaxone Hives, Itching, Rash and Swelling   • Sulfa Antibiotics Hives   • Ciprofloxacin Hives       Social History:  Social History     Socioeconomic History   • Marital status: Single     Spouse name: Not on file   • Number of children: Not on file   • Years of education: Not on file   • Highest education level: Not on file   Tobacco Use   • Smoking status: Never Smoker   • Smokeless tobacco: Never Used   Substance and Sexual Activity   • Alcohol use: No     Frequency: Never   • Drug use: No       Family History:  Family History   Problem Relation Age of Onset   • Diabetes Maternal Grandfather         mellitus   • Heart disease Maternal Grandfather         ischemic   • Cancer Paternal Grandfather    • Heart disease Paternal Grandfather         ischemic   • Bipolar disorder Cousin        Past Medical History :  Patient Active Problem List   Diagnosis   • Acute reaction to stress   • Anxiety   • Elevated C-reactive protein   • Fibrocystic breast disease   • History of varicella    • Other bladder disorder   • Other specific developmental learning difficulties   • Seasonal allergic rhinitis due to pollen   • Vesicoureteral reflux   • Miliaria rubra   • Borderline open-angle glaucoma   • Itchy scalp   • Pruritus       Medication List:    Current Outpatient Medications:   •  busPIRone (BUSPAR) 7.5 MG tablet, TAKE 1 TABLET BY MOUTH ONCE DAILY FOR 7 DAYS AND THEN ONE TAB TWO TIMES A DAY, Disp: , Rfl:   •  cetirizine (zyrTEC) 10 MG tablet, Take 10 mg by mouth Daily., Disp: , Rfl:   •  fexofenadine-pseudoephedrine (ALLEGRA-D 24) 180-240 MG per 24 hr tablet, Take 1 tablet by mouth Daily., Disp: , Rfl:   •  ibuprofen (ADVIL,MOTRIN) 800 MG tablet, Take 1 tablet by mouth Every 8 (Eight) Hours As Needed for Mild Pain ., Disp: 90 tablet, Rfl: 3  •  montelukast (SINGULAIR) 10 MG tablet, Take 10 mg by mouth Every Night., Disp: , Rfl:   •  nystatin-triamcinolone (MYCOLOG) 798450-7.1 UNIT/GM-% ointment, 2 (Two) Times a Day As Needed., Disp: , Rfl:     Past Surgical History:  History reviewed. No pertinent surgical history.    Review of Systems:  Review of Systems   Constitutional: Negative for activity change, fatigue and fever.   HENT: Negative for ear pain, rhinorrhea, sinus pressure, sore throat and voice change.    Eyes: Negative for visual disturbance.   Respiratory: Negative for cough and shortness of breath.    Cardiovascular: Negative for chest pain.   Gastrointestinal: Negative for abdominal pain, diarrhea, nausea and vomiting.   Endocrine: Negative for cold intolerance and heat intolerance.   Genitourinary: Negative for frequency and urgency.   Musculoskeletal: Negative for arthralgias and joint pain.   Skin: Positive for rash.   Neurological: Negative for syncope.   Hematological: Does not bruise/bleed easily.   Psychiatric/Behavioral: Negative for depressed mood. The patient is not nervous/anxious.        Physical Exam:  Vital Signs:  Visit Vitals  /70 (BP Location: Left arm)   Pulse 102  "  Temp 98.6 °F (37 °C) (Temporal)   Resp 16   Ht 160 cm (63\")   Wt 66.2 kg (146 lb)   LMP 09/12/2020 (Exact Date)   SpO2 99%   BMI 25.86 kg/m²       Physical Exam  Vitals signs reviewed.   Constitutional:       Appearance: Normal appearance. She is well-developed.   HENT:      Head: Normocephalic and atraumatic.   Cardiovascular:      Rate and Rhythm: Normal rate and regular rhythm.      Heart sounds: Normal heart sounds. No murmur. No friction rub. No gallop.    Pulmonary:      Effort: Pulmonary effort is normal.      Breath sounds: Normal breath sounds. No wheezing or rales.   Skin:     General: Skin is warm and dry.      Findings: No rash.   Neurological:      Mental Status: She is alert and oriented to person, place, and time.      Coordination: Coordination normal.      Gait: Gait normal.   Psychiatric:         Behavior: Behavior is cooperative.         Assessment and Plan:  Problem List Items Addressed This Visit        Nervous and Auditory    Itchy scalp    Current Assessment & Plan     Doing better with different shampoo.          Pruritus - Primary    Current Assessment & Plan     Much better               An After Visit Summary and PPPS were given to the patient.             I wore protective equipment throughout this patient encounter to include mask, gloves and eye protection. Hand hygiene was performed before donning protective equipment and after removal when leaving the room.   "

## 2021-01-30 ENCOUNTER — TELEPHONE (OUTPATIENT)
Dept: FAMILY MEDICINE CLINIC | Facility: CLINIC | Age: 31
End: 2021-01-30

## 2021-01-30 DIAGNOSIS — F41.9 ANXIETY: Primary | ICD-10-CM

## 2021-02-01 RX ORDER — BUSPIRONE HYDROCHLORIDE 7.5 MG/1
TABLET ORAL
Qty: 60 TABLET | Refills: 12 | Status: SHIPPED | OUTPATIENT
Start: 2021-02-01 | End: 2021-10-25

## 2021-02-23 ENCOUNTER — CLINICAL SUPPORT (OUTPATIENT)
Dept: FAMILY MEDICINE CLINIC | Facility: CLINIC | Age: 31
End: 2021-02-23

## 2021-02-23 DIAGNOSIS — Z23 NEED FOR TDAP VACCINATION: Primary | ICD-10-CM

## 2021-02-23 PROCEDURE — 90471 IMMUNIZATION ADMIN: CPT | Performed by: FAMILY MEDICINE

## 2021-02-23 PROCEDURE — 90715 TDAP VACCINE 7 YRS/> IM: CPT | Performed by: FAMILY MEDICINE

## 2021-05-27 ENCOUNTER — OFFICE VISIT (OUTPATIENT)
Dept: FAMILY MEDICINE CLINIC | Facility: CLINIC | Age: 31
End: 2021-05-27

## 2021-05-27 VITALS
RESPIRATION RATE: 18 BRPM | HEIGHT: 64 IN | DIASTOLIC BLOOD PRESSURE: 76 MMHG | HEART RATE: 80 BPM | TEMPERATURE: 97.5 F | OXYGEN SATURATION: 99 % | BODY MASS INDEX: 24.86 KG/M2 | SYSTOLIC BLOOD PRESSURE: 118 MMHG | WEIGHT: 145.6 LBS

## 2021-05-27 DIAGNOSIS — K59.1 FUNCTIONAL DIARRHEA: ICD-10-CM

## 2021-05-27 DIAGNOSIS — K21.9 GASTROESOPHAGEAL REFLUX DISEASE, UNSPECIFIED WHETHER ESOPHAGITIS PRESENT: Primary | ICD-10-CM

## 2021-05-27 PROBLEM — R19.7 DIARRHEA: Status: ACTIVE | Noted: 2021-05-27

## 2021-05-27 PROCEDURE — 99214 OFFICE O/P EST MOD 30 MIN: CPT | Performed by: FAMILY MEDICINE

## 2021-05-27 RX ORDER — SUCRALFATE 1 G/1
1 TABLET ORAL 4 TIMES DAILY
Qty: 120 TABLET | Refills: 0 | Status: SHIPPED | OUTPATIENT
Start: 2021-05-27 | End: 2021-06-17

## 2021-05-27 RX ORDER — OMEPRAZOLE 20 MG/1
20 CAPSULE, DELAYED RELEASE ORAL DAILY
Qty: 30 CAPSULE | Refills: 3 | Status: SHIPPED | OUTPATIENT
Start: 2021-05-27 | End: 2021-10-25

## 2021-05-27 NOTE — ASSESSMENT & PLAN NOTE
Limit tobacco, alcohol, caffeine, chocalate, citrus fruits, recumbency after meals and large portions. Discussed link between PPI's and increased risk of hip, wrist, and spine fractures    Diagnosis, treatment and and course discussed. Potential side effects discussed. Return if there is worsening or persistence of symptoms.

## 2021-05-27 NOTE — PROGRESS NOTES
Subjective   Evangelist Mosher is a 31 y.o. female.     Chief Complaint   Patient presents with   • Heartburn   • Diarrhea       Heartburn  She complains of belching and heartburn. She reports no abdominal pain, no chest pain, no coughing or no nausea. This is a new problem. The current episode started more than 1 month ago. The problem occurs frequently (1-2 x weekly). The problem has been unchanged. The heartburn duration is an hour. The heartburn is located in the substernum. The heartburn is of mild intensity. The heartburn does not wake her from sleep. The heartburn does not limit her activity. The heartburn doesn't change with position. Nothing aggravates the symptoms. She has tried an antacid for the symptoms. The treatment provided no relief.   Diarrhea   This is a new problem. The current episode started more than 1 month ago. Episode frequency: only with heartburn. The problem has been unchanged. The stool consistency is described as watery. The patient states that diarrhea does not awaken her from sleep. Pertinent negatives include no abdominal pain, arthralgias, coughing, fever or vomiting. The treatment provided no relief.    No blood in her stool    I personally reviewed and updated the patient's allergies, medications, problem list, and past medical, surgical, social, and family history. I have reviewed and confirmed the accuracy of the History of Present Illness and Review of Symptoms as documented by the MA/LPN/RN. Tanja Caro MD    Allergies:  Allergies   Allergen Reactions   • Ceftriaxone Hives, Itching, Rash and Swelling   • Sulfa Antibiotics Hives   • Ciprofloxacin Hives       Social History:  Social History     Socioeconomic History   • Marital status: Single     Spouse name: Not on file   • Number of children: Not on file   • Years of education: Not on file   • Highest education level: Not on file   Tobacco Use   • Smoking status: Never Smoker   • Smokeless tobacco: Never Used   Substance  and Sexual Activity   • Alcohol use: No   • Drug use: No       Family History:  Family History   Problem Relation Age of Onset   • Diabetes Maternal Grandfather         mellitus   • Heart disease Maternal Grandfather         ischemic   • Cancer Paternal Grandfather    • Heart disease Paternal Grandfather         ischemic   • Bipolar disorder Cousin        Past Medical History :  Patient Active Problem List   Diagnosis   • Acute reaction to stress   • Anxiety   • Elevated C-reactive protein   • Fibrocystic breast disease   • History of varicella   • Other bladder disorder   • Other specific developmental learning difficulties   • Seasonal allergic rhinitis due to pollen   • Vesicoureteral reflux   • Miliaria rubra   • Borderline open-angle glaucoma   • Itchy scalp   • Pruritus   • GERD (gastroesophageal reflux disease)   • Diarrhea       Medication List:    Current Outpatient Medications:   •  busPIRone (BUSPAR) 7.5 MG tablet, TAKE 1 TABLET BY MOUTH ONCE DAILY FOR 7 DAYS AND  THEN  ONE  TAB  TWO  TIMES  A  DAY, Disp: 60 tablet, Rfl: 12  •  cetirizine (zyrTEC) 10 MG tablet, Take 10 mg by mouth Daily., Disp: , Rfl:   •  fexofenadine-pseudoephedrine (ALLEGRA-D 24) 180-240 MG per 24 hr tablet, Take 1 tablet by mouth Daily., Disp: , Rfl:   •  montelukast (SINGULAIR) 10 MG tablet, Take 10 mg by mouth Every Night., Disp: , Rfl:   •  nystatin-triamcinolone (MYCOLOG) 188603-8.1 UNIT/GM-% ointment, 2 (Two) Times a Day As Needed., Disp: , Rfl:   •  omeprazole (priLOSEC) 20 MG capsule, Take 1 capsule by mouth Daily., Disp: 30 capsule, Rfl: 3  •  sucralfate (CARAFATE) 1 g tablet, Take 1 tablet by mouth 4 (Four) Times a Day., Disp: 120 tablet, Rfl: 0    Past Surgical History:  History reviewed. No pertinent surgical history.    Review of Systems:  Review of Systems   Constitutional: Negative for activity change and fever.   HENT: Negative for ear pain, rhinorrhea, sinus pressure and voice change.    Eyes: Negative for visual  "disturbance.   Respiratory: Negative for cough and shortness of breath.    Cardiovascular: Negative for chest pain.   Gastrointestinal: Positive for diarrhea. Negative for abdominal pain, nausea and vomiting.   Endocrine: Negative for cold intolerance and heat intolerance.   Genitourinary: Negative for frequency and urgency.   Musculoskeletal: Negative for arthralgias.   Skin: Negative for rash.   Neurological: Negative for syncope.   Hematological: Does not bruise/bleed easily.   Psychiatric/Behavioral: Negative for depressed mood. The patient is not nervous/anxious.        Physical Exam:  Vital Signs:  Vital Signs:   /76 (BP Location: Left arm, Patient Position: Sitting, Cuff Size: Adult)   Pulse 80   Temp 97.5 °F (36.4 °C) (Temporal)   Resp 18   Ht 162.6 cm (64\")   Wt 66 kg (145 lb 9.6 oz)   SpO2 99%   BMI 24.99 kg/m²     Result Review :                Physical Exam  Vitals reviewed.   Constitutional:       Appearance: Normal appearance. She is well-developed.   HENT:      Head: Normocephalic and atraumatic.   Eyes:      General:         Right eye: No discharge.         Left eye: No discharge.   Cardiovascular:      Rate and Rhythm: Normal rate and regular rhythm.      Heart sounds: Normal heart sounds. No murmur heard.   No friction rub. No gallop.    Pulmonary:      Effort: Pulmonary effort is normal. No respiratory distress.      Breath sounds: Normal breath sounds. No wheezing or rales.   Abdominal:      General: Abdomen is flat. Bowel sounds are normal. There is no distension.      Palpations: Abdomen is soft. There is no mass.      Tenderness: There is abdominal tenderness. There is no guarding or rebound.      Hernia: No hernia is present.   Skin:     General: Skin is warm and dry.      Findings: No rash.   Neurological:      Mental Status: She is alert and oriented to person, place, and time.      Coordination: Coordination normal.      Gait: Gait normal.   Psychiatric:         Behavior: " Behavior is cooperative.         Assessment and Plan:  Problems Addressed this Visit        Gastrointestinal Abdominal     GERD (gastroesophageal reflux disease) - Primary     Limit tobacco, alcohol, caffeine, chocalate, citrus fruits, recumbency after meals and large portions. Discussed link between PPI's and increased risk of hip, wrist, and spine fractures    Diagnosis, treatment and and course discussed. Potential side effects discussed. Return if there is worsening or persistence of symptoms.            Relevant Medications    sucralfate (CARAFATE) 1 g tablet    omeprazole (priLOSEC) 20 MG capsule    Diarrhea     From gerd most likely    Will check labs if not improvement           Diagnoses       Codes Comments    Gastroesophageal reflux disease, unspecified whether esophagitis present    -  Primary ICD-10-CM: K21.9  ICD-9-CM: 530.81     Functional diarrhea     ICD-10-CM: K59.1  ICD-9-CM: 564.5            An After Visit Summary and PPPS were given to the patient.         I wore protective equipment throughout this patient encounter to include mask. Hand hygiene was performed before donning protective equipment and after removal when leaving the room.

## 2021-06-17 ENCOUNTER — OFFICE VISIT (OUTPATIENT)
Dept: FAMILY MEDICINE CLINIC | Facility: CLINIC | Age: 31
End: 2021-06-17

## 2021-06-17 VITALS
BODY MASS INDEX: 24.45 KG/M2 | WEIGHT: 143.2 LBS | RESPIRATION RATE: 18 BRPM | SYSTOLIC BLOOD PRESSURE: 120 MMHG | HEIGHT: 64 IN | DIASTOLIC BLOOD PRESSURE: 80 MMHG | TEMPERATURE: 97.8 F | OXYGEN SATURATION: 98 % | HEART RATE: 105 BPM

## 2021-06-17 DIAGNOSIS — K21.9 GASTROESOPHAGEAL REFLUX DISEASE, UNSPECIFIED WHETHER ESOPHAGITIS PRESENT: ICD-10-CM

## 2021-06-17 DIAGNOSIS — R19.8 ALTERNATING CONSTIPATION AND DIARRHEA: Primary | ICD-10-CM

## 2021-06-17 PROBLEM — R19.7 DIARRHEA: Status: RESOLVED | Noted: 2021-05-27 | Resolved: 2021-06-17

## 2021-06-17 PROCEDURE — 99214 OFFICE O/P EST MOD 30 MIN: CPT | Performed by: FAMILY MEDICINE

## 2021-06-17 RX ORDER — NITROFURANTOIN 25; 75 MG/1; MG/1
CAPSULE ORAL
COMMUNITY
Start: 2021-05-27 | End: 2021-10-25

## 2021-06-17 NOTE — PROGRESS NOTES
Subjective   Evangelist Mosher is a 31 y.o. female.     Chief Complaint   Patient presents with   • Heartburn       Heartburn  She complains of heartburn. She reports no abdominal pain, no chest pain, no coughing or no nausea. This is a recurrent problem. The current episode started more than 1 month ago. The problem occurs frequently. The problem has been waxing and waning. The symptoms are aggravated by certain foods. There are no known risk factors. She has tried a diet change and a PPI for the symptoms. The treatment provided mild relief.        I personally reviewed and updated the patient's allergies, medications, problem list, and past medical, surgical, social, and family history. I have reviewed and confirmed the accuracy of the History of Present Illness and Review of Symptoms as documented by the MA/LPN/RN. Tanja Caro MD    Allergies:  Allergies   Allergen Reactions   • Ceftriaxone Hives, Itching, Rash and Swelling   • Sulfa Antibiotics Hives   • Ciprofloxacin Hives       Social History:  Social History     Socioeconomic History   • Marital status: Single     Spouse name: Not on file   • Number of children: Not on file   • Years of education: Not on file   • Highest education level: Not on file   Tobacco Use   • Smoking status: Never Smoker   • Smokeless tobacco: Never Used   Substance and Sexual Activity   • Alcohol use: No   • Drug use: No       Family History:  Family History   Problem Relation Age of Onset   • Diabetes Maternal Grandfather         mellitus   • Heart disease Maternal Grandfather         ischemic   • Cancer Paternal Grandfather    • Heart disease Paternal Grandfather         ischemic   • Bipolar disorder Cousin        Past Medical History :  Patient Active Problem List   Diagnosis   • Acute reaction to stress   • Anxiety   • Elevated C-reactive protein   • Fibrocystic breast disease   • History of varicella   • Other bladder disorder   • Other specific developmental learning  difficulties   • Seasonal allergic rhinitis due to pollen   • Vesicoureteral reflux   • Miliaria rubra   • Borderline open-angle glaucoma   • Itchy scalp   • Pruritus   • GERD (gastroesophageal reflux disease)   • Alternating constipation and diarrhea       Medication List:    Current Outpatient Medications:   •  busPIRone (BUSPAR) 7.5 MG tablet, TAKE 1 TABLET BY MOUTH ONCE DAILY FOR 7 DAYS AND  THEN  ONE  TAB  TWO  TIMES  A  DAY, Disp: 60 tablet, Rfl: 12  •  cetirizine (zyrTEC) 10 MG tablet, Take 10 mg by mouth Daily., Disp: , Rfl:   •  fexofenadine-pseudoephedrine (ALLEGRA-D 24) 180-240 MG per 24 hr tablet, Take 1 tablet by mouth Daily., Disp: , Rfl:   •  montelukast (SINGULAIR) 10 MG tablet, Take 10 mg by mouth Every Night., Disp: , Rfl:   •  nitrofurantoin, macrocrystal-monohydrate, (MACROBID) 100 MG capsule, TAKE 1 CAPSULE BY MOUTH AFTER EACH BLADDER INSTILLATION FOR 10 DAYS, Disp: , Rfl:   •  nystatin-triamcinolone (MYCOLOG) 017497-1.1 UNIT/GM-% ointment, 2 (Two) Times a Day As Needed., Disp: , Rfl:   •  omeprazole (priLOSEC) 20 MG capsule, Take 1 capsule by mouth Daily., Disp: 30 capsule, Rfl: 3    Past Surgical History:  History reviewed. No pertinent surgical history.    Review of Systems:  Review of Systems   Constitutional: Negative for activity change and fever.   HENT: Negative for ear pain, rhinorrhea, sinus pressure and voice change.    Eyes: Negative for visual disturbance.   Respiratory: Negative for cough and shortness of breath.    Cardiovascular: Negative for chest pain.   Gastrointestinal: Negative for abdominal pain, diarrhea, nausea and vomiting.   Endocrine: Negative for cold intolerance and heat intolerance.   Genitourinary: Negative for frequency and urgency.   Musculoskeletal: Negative for arthralgias.   Skin: Negative for rash.   Neurological: Negative for syncope.   Hematological: Does not bruise/bleed easily.   Psychiatric/Behavioral: Negative for depressed mood. The patient is not  "nervous/anxious.        Physical Exam:  Vital Signs:  Vital Signs:   /80 (BP Location: Left arm, Patient Position: Sitting, Cuff Size: Adult)   Pulse 105   Temp 97.8 °F (36.6 °C)   Resp 18   Ht 162.6 cm (64.02\")   Wt 65 kg (143 lb 3.2 oz)   SpO2 98%   BMI 24.57 kg/m²     Result Review :                Physical Exam  Vitals reviewed.   Constitutional:       Appearance: Normal appearance. She is well-developed.   HENT:      Head: Normocephalic and atraumatic.   Eyes:      General:         Right eye: No discharge.         Left eye: No discharge.   Cardiovascular:      Rate and Rhythm: Normal rate and regular rhythm.      Heart sounds: Normal heart sounds. No murmur heard.   No friction rub. No gallop.    Pulmonary:      Effort: Pulmonary effort is normal. No respiratory distress.      Breath sounds: Normal breath sounds. No wheezing or rales.   Skin:     General: Skin is warm and dry.      Findings: No rash.   Neurological:      Mental Status: She is alert and oriented to person, place, and time.      Coordination: Coordination normal.      Gait: Gait normal.   Psychiatric:         Behavior: Behavior is cooperative.         Assessment and Plan:  Problems Addressed this Visit        Gastrointestinal Abdominal     GERD (gastroesophageal reflux disease)     Much better         Alternating constipation and diarrhea - Primary     Will have her see GI. Does not sound like IBS. Will get labs           Relevant Orders    CBC & Differential (Completed)    Comprehensive Metabolic Panel (Completed)    Amylase (Completed)    Lipase (Completed)      Diagnoses       Codes Comments    Alternating constipation and diarrhea    -  Primary ICD-10-CM: R19.8  ICD-9-CM: 787.99     Gastroesophageal reflux disease, unspecified whether esophagitis present     ICD-10-CM: K21.9  ICD-9-CM: 530.81            An After Visit Summary and PPPS were given to the patient.         I wore protective equipment throughout this patient encounter " to include mask. Hand hygiene was performed before donning protective equipment and after removal when leaving the room.

## 2021-06-18 ENCOUNTER — TELEPHONE (OUTPATIENT)
Dept: FAMILY MEDICINE CLINIC | Facility: CLINIC | Age: 31
End: 2021-06-18

## 2021-06-18 LAB
ALBUMIN SERPL-MCNC: 4.6 G/DL (ref 3.8–4.8)
ALBUMIN/GLOB SERPL: 1.7 {RATIO} (ref 1.2–2.2)
ALP SERPL-CCNC: 109 IU/L (ref 48–121)
ALT SERPL-CCNC: 14 IU/L (ref 0–32)
AMYLASE SERPL-CCNC: 68 U/L (ref 31–110)
AST SERPL-CCNC: 13 IU/L (ref 0–40)
BASOPHILS # BLD AUTO: 0 X10E3/UL (ref 0–0.2)
BASOPHILS NFR BLD AUTO: 0 %
BILIRUB SERPL-MCNC: 0.7 MG/DL (ref 0–1.2)
BUN SERPL-MCNC: 11 MG/DL (ref 6–20)
BUN/CREAT SERPL: 14 (ref 9–23)
CALCIUM SERPL-MCNC: 9.7 MG/DL (ref 8.7–10.2)
CHLORIDE SERPL-SCNC: 101 MMOL/L (ref 96–106)
CO2 SERPL-SCNC: 25 MMOL/L (ref 20–29)
CREAT SERPL-MCNC: 0.8 MG/DL (ref 0.57–1)
EOSINOPHIL # BLD AUTO: 0.2 X10E3/UL (ref 0–0.4)
EOSINOPHIL NFR BLD AUTO: 3 %
ERYTHROCYTE [DISTWIDTH] IN BLOOD BY AUTOMATED COUNT: 12.5 % (ref 11.7–15.4)
GLOBULIN SER CALC-MCNC: 2.7 G/DL (ref 1.5–4.5)
GLUCOSE SERPL-MCNC: 84 MG/DL (ref 65–99)
HCT VFR BLD AUTO: 39.9 % (ref 34–46.6)
HGB BLD-MCNC: 13.4 G/DL (ref 11.1–15.9)
IMM GRANULOCYTES # BLD AUTO: 0 X10E3/UL (ref 0–0.1)
IMM GRANULOCYTES NFR BLD AUTO: 0 %
LIPASE SERPL-CCNC: 26 U/L (ref 14–72)
LYMPHOCYTES # BLD AUTO: 1.8 X10E3/UL (ref 0.7–3.1)
LYMPHOCYTES NFR BLD AUTO: 28 %
MCH RBC QN AUTO: 30.4 PG (ref 26.6–33)
MCHC RBC AUTO-ENTMCNC: 33.6 G/DL (ref 31.5–35.7)
MCV RBC AUTO: 91 FL (ref 79–97)
MONOCYTES # BLD AUTO: 0.4 X10E3/UL (ref 0.1–0.9)
MONOCYTES NFR BLD AUTO: 7 %
NEUTROPHILS # BLD AUTO: 4.1 X10E3/UL (ref 1.4–7)
NEUTROPHILS NFR BLD AUTO: 62 %
NRBC BLD AUTO-RTO: 1 % (ref 0–0)
PLATELET # BLD AUTO: 250 X10E3/UL (ref 150–450)
POTASSIUM SERPL-SCNC: 5 MMOL/L (ref 3.5–5.2)
PROT SERPL-MCNC: 7.3 G/DL (ref 6–8.5)
RBC # BLD AUTO: 4.41 X10E6/UL (ref 3.77–5.28)
SODIUM SERPL-SCNC: 138 MMOL/L (ref 134–144)
WBC # BLD AUTO: 6.6 X10E3/UL (ref 3.4–10.8)

## 2021-07-08 ENCOUNTER — TELEPHONE (OUTPATIENT)
Dept: FAMILY MEDICINE CLINIC | Facility: CLINIC | Age: 31
End: 2021-07-08

## 2021-07-26 ENCOUNTER — OFFICE VISIT (OUTPATIENT)
Dept: FAMILY MEDICINE CLINIC | Facility: CLINIC | Age: 31
End: 2021-07-26

## 2021-07-26 VITALS
DIASTOLIC BLOOD PRESSURE: 78 MMHG | BODY MASS INDEX: 24.41 KG/M2 | WEIGHT: 143 LBS | TEMPERATURE: 98.2 F | HEIGHT: 64 IN | RESPIRATION RATE: 18 BRPM | OXYGEN SATURATION: 96 % | SYSTOLIC BLOOD PRESSURE: 122 MMHG | HEART RATE: 84 BPM

## 2021-07-26 DIAGNOSIS — A60.09 HERPES SIMPLEX OF FEMALE GENITALIA: Primary | ICD-10-CM

## 2021-07-26 DIAGNOSIS — F41.9 ANXIETY: ICD-10-CM

## 2021-07-26 DIAGNOSIS — Z13.220 SCREENING FOR HYPERLIPIDEMIA: ICD-10-CM

## 2021-07-26 DIAGNOSIS — Z00.01 ENCOUNTER FOR GENERAL ADULT MEDICAL EXAMINATION WITH ABNORMAL FINDINGS: ICD-10-CM

## 2021-07-26 LAB
BILIRUB BLD-MCNC: NEGATIVE MG/DL
CLARITY, POC: CLEAR
COLOR UR: YELLOW
GLUCOSE UR STRIP-MCNC: NEGATIVE MG/DL
KETONES UR QL: NEGATIVE
LEUKOCYTE EST, POC: NEGATIVE
NITRITE UR-MCNC: NEGATIVE MG/ML
PH UR: 6.5 [PH] (ref 5–8)
PROT UR STRIP-MCNC: ABNORMAL MG/DL
RBC # UR STRIP: NEGATIVE /UL
SP GR UR: 1.01 (ref 1–1.03)
UROBILINOGEN UR QL: NORMAL

## 2021-07-26 PROCEDURE — 81003 URINALYSIS AUTO W/O SCOPE: CPT | Performed by: FAMILY MEDICINE

## 2021-07-26 PROCEDURE — 99395 PREV VISIT EST AGE 18-39: CPT | Performed by: FAMILY MEDICINE

## 2021-07-26 RX ORDER — SORBITOL SOLUTION 70 %
SOLUTION, ORAL MISCELLANEOUS SEE ADMIN INSTRUCTIONS
COMMUNITY
Start: 2021-07-20 | End: 2021-10-25

## 2021-07-26 RX ORDER — VALACYCLOVIR HYDROCHLORIDE 1 G/1
1000 TABLET, FILM COATED ORAL 2 TIMES DAILY
COMMUNITY
Start: 2021-06-21 | End: 2021-10-25

## 2021-07-26 RX ORDER — ETONOGESTREL AND ETHINYL ESTRADIOL .12; .015 MG/D; MG/D
RING VAGINAL
COMMUNITY
Start: 2021-07-21 | End: 2022-01-06

## 2021-07-26 NOTE — PROGRESS NOTES
"  Chief Complaint   Patient presents with   • Annual Exam         Subjective   Evangelist Mosher is a 31 y.o. female here for a Well Woman Visit. Energy level is described as poor and she is sleeping fairly well. She sleeps 8 hours nightly. Last pap was 2020  by Dr Andrew's office. Contraception: Ring. Patient exercises regularly a few  times weekly. Nutrition is described as in general, a \"healthy\" diet  . Her   libido is normal. She reports that she does not perform monthly self breast exam.      Pt was recently diagnosed with Herpes.         I personally reviewed and updated the patient's allergies, medications, problem list, and past medical, surgical, social, and family history.     Allergies:  Allergies   Allergen Reactions   • Ceftriaxone Hives, Itching, Rash and Swelling   • Sulfa Antibiotics Hives   • Ciprofloxacin Hives       Social History:  Social History     Socioeconomic History   • Marital status: Single     Spouse name: Not on file   • Number of children: Not on file   • Years of education: Not on file   • Highest education level: Not on file   Tobacco Use   • Smoking status: Never Smoker   • Smokeless tobacco: Never Used   Substance and Sexual Activity   • Alcohol use: No   • Drug use: No       Family History:  Family History   Problem Relation Age of Onset   • Diabetes Maternal Grandfather         mellitus   • Heart disease Maternal Grandfather         ischemic   • Cancer Paternal Grandfather         ?   • Heart disease Paternal Grandfather         ischemic   • Bipolar disorder Cousin        Past Medical History :  Active Ambulatory Problems     Diagnosis Date Noted   • Acute reaction to stress 09/27/2019   • Anxiety 09/27/2019   • Elevated C-reactive protein 09/27/2019   • Fibrocystic breast disease 09/27/2019   • History of varicella 12/23/1993   • Other bladder disorder 09/27/2019   • Other specific developmental learning difficulties 09/27/2019   • Seasonal allergic rhinitis due to pollen " 09/27/2019   • Vesicoureteral reflux 09/27/2019   • Miliaria rubra 06/04/2020   • Borderline open-angle glaucoma 09/06/2019   • Itchy scalp 09/14/2020   • Pruritus 09/14/2020   • GERD (gastroesophageal reflux disease) 05/27/2021   • Alternating constipation and diarrhea 06/17/2021   • Herpes simplex of female genitalia 07/26/2021   • Encounter for general adult medical examination with abnormal findings 07/26/2021   • Screening for hyperlipidemia 07/26/2021     Resolved Ambulatory Problems     Diagnosis Date Noted   • Allergic drug reaction 09/27/2019   • Diarrhea 05/27/2021     Past Medical History:   Diagnosis Date   • Acne varioliformis    • Acute bilateral low back pain without sciatica .   • Acute maxillary sinusitis    • Acute pain of both knees    • Allergic reaction to drug    • Annual physical exam    • Benign familial tremor    • Benign skin lesion    • Bladder trabeculation    • Candidal vaginitis    • Central auditory processing disorder    • Cervical cancer screening    • Difficulty breathing    • Dizziness    • Encounter for removal of sutures    • History of chicken pox    • Irregular menstrual cycle    • Lentiginous compound nevus of abdominal wall    • Malaise and fatigue    • Overweight (BMI 25.0-29.9)    • Reactive airway disease without complication    • Screening for thyroid disorder    • Tobacco use disorder        Medication List:    Current Outpatient Medications:   •  busPIRone (BUSPAR) 7.5 MG tablet, TAKE 1 TABLET BY MOUTH ONCE DAILY FOR 7 DAYS AND  THEN  ONE  TAB  TWO  TIMES  A  DAY, Disp: 60 tablet, Rfl: 12  •  cetirizine (zyrTEC) 10 MG tablet, Take 10 mg by mouth Daily., Disp: , Rfl:   •  EluRyng 0.12-0.015 MG/24HR vaginal ring, , Disp: , Rfl:   •  fexofenadine-pseudoephedrine (ALLEGRA-D 24) 180-240 MG per 24 hr tablet, Take 1 tablet by mouth Daily., Disp: , Rfl:   •  montelukast (SINGULAIR) 10 MG tablet, Take 10 mg by mouth Every Night., Disp: , Rfl:   •  nitrofurantoin,  "macrocrystal-monohydrate, (MACROBID) 100 MG capsule, TAKE 1 CAPSULE BY MOUTH AFTER EACH BLADDER INSTILLATION FOR 10 DAYS, Disp: , Rfl:   •  nystatin-triamcinolone (MYCOLOG) 141156-6.1 UNIT/GM-% ointment, 2 (Two) Times a Day As Needed., Disp: , Rfl:   •  omeprazole (priLOSEC) 20 MG capsule, Take 1 capsule by mouth Daily., Disp: 30 capsule, Rfl: 3  •  sorbitol 70 % solution solution, See Admin Instructions., Disp: , Rfl:   •  valACYclovir (VALTREX) 1000 MG tablet, Take 1,000 mg by mouth 2 (Two) Times a Day. for 7 days, Disp: , Rfl:     Past Surgical History:  History reviewed. No pertinent surgical history.    Depression Screen:   PHQ-2/PHQ-9 Depression Screening 9/27/2019   Little interest or pleasure in doing things 0   Feeling down, depressed, or hopeless 0   Total Score 0       Fall Risk Screen:  [facility]    Review Of Systems:  Review of Systems   Constitutional: Negative for activity change and fever.   HENT: Negative for ear pain, rhinorrhea, sinus pressure and voice change.    Eyes: Negative for visual disturbance.   Respiratory: Negative for cough and shortness of breath.    Cardiovascular: Negative for chest pain.   Gastrointestinal: Negative for abdominal pain, diarrhea, nausea and vomiting.   Endocrine: Negative for cold intolerance and heat intolerance.   Genitourinary: Negative for frequency and urgency.   Musculoskeletal: Negative for arthralgias.   Skin: Negative for rash.   Neurological: Negative for syncope.   Hematological: Does not bruise/bleed easily.   Psychiatric/Behavioral: Negative for depressed mood. The patient is not nervous/anxious.        Physical Exam:  Vital Signs:  Visit Vitals  /78 (BP Location: Left arm, Patient Position: Sitting, Cuff Size: Adult)   Pulse 84   Temp 98.2 °F (36.8 °C)   Resp 18   Ht 162.6 cm (64\")   Wt 64.9 kg (143 lb)   SpO2 96%   BMI 24.55 kg/m²       Physical Exam  Vitals and nursing note reviewed.   Constitutional:  "      General: She is not in acute distress.     Appearance: She is well-developed. She is not diaphoretic.   HENT:      Head: Normocephalic and atraumatic.      Right Ear: External ear normal.      Left Ear: External ear normal.      Nose: Nose normal.      Mouth/Throat:      Pharynx: No oropharyngeal exudate.   Eyes:      General: No scleral icterus.        Right eye: No discharge.         Left eye: No discharge.      Conjunctiva/sclera: Conjunctivae normal.      Pupils: Pupils are equal, round, and reactive to light.   Neck:      Thyroid: No thyromegaly.      Trachea: No tracheal deviation.   Cardiovascular:      Rate and Rhythm: Normal rate and regular rhythm.      Heart sounds: Normal heart sounds. No murmur heard.   No friction rub. No gallop.    Pulmonary:      Effort: Pulmonary effort is normal. No respiratory distress.      Breath sounds: Normal breath sounds. No stridor. No wheezing or rales.   Abdominal:      General: Bowel sounds are normal. There is no distension.      Palpations: Abdomen is soft. There is no mass.      Tenderness: There is no abdominal tenderness. There is no guarding or rebound.   Musculoskeletal:         General: No tenderness or deformity. Normal range of motion.      Cervical back: Normal range of motion and neck supple.   Lymphadenopathy:      Cervical: No cervical adenopathy.   Skin:     General: Skin is warm and dry.      Capillary Refill: Capillary refill takes less than 2 seconds.      Coloration: Skin is not pale.      Findings: No erythema or rash.   Neurological:      Mental Status: She is alert and oriented to person, place, and time.      Cranial Nerves: No cranial nerve deficit.      Sensory: No sensory deficit.      Motor: No tremor, atrophy or abnormal muscle tone.      Coordination: Coordination normal.      Gait: Gait normal.      Deep Tendon Reflexes: Reflexes are normal and symmetric. Reflexes normal.   Psychiatric:         Behavior: Behavior normal.         Thought  Content: Thought content normal.         Cognition and Memory: Memory is not impaired. She does not exhibit impaired recent memory or impaired remote memory.         Judgment: Judgment normal.           Assessment and Plan:  Problem List Items Addressed This Visit        Cardiac and Vasculature    Screening for hyperlipidemia    Relevant Orders    Lipid Panel With / Chol / HDL Ratio (Completed)       Health Encounters    Encounter for general adult medical examination with abnormal findings    Relevant Orders    POC Urinalysis Dipstick, Multipro (Completed)    TSH (Completed)       Mental Health    Anxiety    Overview     buspar is helping  Will continue current treament            Other    Herpes simplex of female genitalia - Primary    Relevant Medications    valACYclovir (VALTREX) 1000 MG tablet          An After Visit Summary and PPPS were given to the patient.       Discussed injury prevention, diet and exercise, safe sexual practices, and screening for common diseases. Encouraged use of sunscreen and seatbelts. Discussed timing of  cervical cancer screening. Encouraged monthly self-breast exams, yearly clinical breast exams, and discussed timing of mammograms. Avoidance of tobacco encouraged. Limitation or avoidance of alcohol encouraged. Recommend yearly dental and eye exams. Also discussed monitoring of blood pressure, lipids.     I wore protective equipment throughout this patient encounter to include mask. Hand hygiene was performed before donning protective equipment and after removal when leaving the room.

## 2021-07-28 LAB
CHOLEST SERPL-MCNC: 183 MG/DL (ref 100–199)
CHOLEST/HDLC SERPL: 2.6 RATIO (ref 0–4.4)
HDLC SERPL-MCNC: 71 MG/DL
LDLC SERPL CALC-MCNC: 92 MG/DL (ref 0–99)
TRIGL SERPL-MCNC: 116 MG/DL (ref 0–149)
TSH SERPL DL<=0.005 MIU/L-ACNC: 1.58 UIU/ML (ref 0.45–4.5)
VLDLC SERPL CALC-MCNC: 20 MG/DL (ref 5–40)

## 2021-07-30 ENCOUNTER — TELEPHONE (OUTPATIENT)
Dept: FAMILY MEDICINE CLINIC | Facility: CLINIC | Age: 31
End: 2021-07-30

## 2021-07-30 NOTE — TELEPHONE ENCOUNTER
Caller: Evangelist Mosher    Relationship: Self    Best call back number: [phone]     Caller requesting test results: PATIENT    What test was performed: LABS    When was the test performed: TWO DAYS AGO

## 2021-08-12 ENCOUNTER — OFFICE VISIT (OUTPATIENT)
Dept: FAMILY MEDICINE CLINIC | Facility: CLINIC | Age: 31
End: 2021-08-12

## 2021-08-12 VITALS
RESPIRATION RATE: 18 BRPM | OXYGEN SATURATION: 98 % | BODY MASS INDEX: 24.34 KG/M2 | HEIGHT: 64 IN | WEIGHT: 142.6 LBS | DIASTOLIC BLOOD PRESSURE: 71 MMHG | HEART RATE: 118 BPM | SYSTOLIC BLOOD PRESSURE: 103 MMHG | TEMPERATURE: 98 F

## 2021-08-12 DIAGNOSIS — R05.8 COUGH WITH EXPOSURE TO COVID-19 VIRUS: ICD-10-CM

## 2021-08-12 DIAGNOSIS — J06.9 VIRAL UPPER RESPIRATORY TRACT INFECTION: Primary | ICD-10-CM

## 2021-08-12 DIAGNOSIS — Z20.822 COUGH WITH EXPOSURE TO COVID-19 VIRUS: ICD-10-CM

## 2021-08-12 PROCEDURE — 99213 OFFICE O/P EST LOW 20 MIN: CPT | Performed by: FAMILY MEDICINE

## 2021-08-12 RX ORDER — METHYLPREDNISOLONE 4 MG/1
TABLET ORAL
Qty: 21 TABLET | Refills: 0 | Status: SHIPPED | OUTPATIENT
Start: 2021-08-12 | End: 2021-10-25

## 2021-08-12 RX ORDER — FLUCONAZOLE 150 MG/1
150 TABLET ORAL ONCE
Qty: 1 TABLET | Refills: 0 | Status: SHIPPED | OUTPATIENT
Start: 2021-08-12 | End: 2021-08-12

## 2021-08-12 RX ORDER — AZITHROMYCIN 250 MG/1
TABLET, FILM COATED ORAL
Qty: 6 TABLET | Refills: 0 | Status: SHIPPED | OUTPATIENT
Start: 2021-08-12 | End: 2021-10-25

## 2021-08-12 NOTE — PROGRESS NOTES
Subjective   Evangelist Mosher is a 31 y.o. female.   Chief Complaint   Patient presents with   • URI       Covid tests at home neg x 5     URI   This is a new problem. The current episode started in the past 7 days. There has been no fever. Associated symptoms include congestion, coughing, ear pain, headaches, joint pain, rhinorrhea, sinus pain, sneezing and a sore throat. Pertinent negatives include no abdominal pain, chest pain, diarrhea, dysuria, nausea, neck pain, plugged ear sensation, rash, swollen glands, vomiting or wheezing. She has tried nothing for the symptoms. The treatment provided no relief.        The following portions of the patient's history were reviewed and updated as appropriate: allergies, current medications, past family history, past medical history, past social history, past surgical history and problem list.    Patient Active Problem List   Diagnosis   • Acute reaction to stress   • Anxiety   • Elevated C-reactive protein   • Fibrocystic breast disease   • History of varicella   • Other bladder disorder   • Other specific developmental learning difficulties   • Seasonal allergic rhinitis due to pollen   • Vesicoureteral reflux   • Miliaria rubra   • Borderline open-angle glaucoma   • Itchy scalp   • Pruritus   • GERD (gastroesophageal reflux disease)   • Alternating constipation and diarrhea   • Herpes simplex of female genitalia   • Encounter for general adult medical examination with abnormal findings   • Screening for hyperlipidemia   • Cough with exposure to COVID-19 virus       Current Outpatient Medications on File Prior to Visit   Medication Sig Dispense Refill   • busPIRone (BUSPAR) 7.5 MG tablet TAKE 1 TABLET BY MOUTH ONCE DAILY FOR 7 DAYS AND  THEN  ONE  TAB  TWO  TIMES  A  DAY 60 tablet 12   • cetirizine (zyrTEC) 10 MG tablet Take 10 mg by mouth Daily.     • EluRyng 0.12-0.015 MG/24HR vaginal ring      • fexofenadine-pseudoephedrine (ALLEGRA-D 24) 180-240 MG per 24 hr tablet  Take 1 tablet by mouth Daily.     • montelukast (SINGULAIR) 10 MG tablet Take 10 mg by mouth Every Night.     • nitrofurantoin, macrocrystal-monohydrate, (MACROBID) 100 MG capsule TAKE 1 CAPSULE BY MOUTH AFTER EACH BLADDER INSTILLATION FOR 10 DAYS     • nystatin-triamcinolone (MYCOLOG) 395724-5.1 UNIT/GM-% ointment 2 (Two) Times a Day As Needed.     • omeprazole (priLOSEC) 20 MG capsule Take 1 capsule by mouth Daily. 30 capsule 3   • sorbitol 70 % solution solution See Admin Instructions.     • valACYclovir (VALTREX) 1000 MG tablet Take 1,000 mg by mouth 2 (Two) Times a Day. for 7 days       No current facility-administered medications on file prior to visit.     Current outpatient and discharge medications have been reconciled for the patient.  Reviewed by: Nazario Mcdonald MD      Allergies   Allergen Reactions   • Ceftriaxone Hives, Itching, Rash and Swelling   • Sulfa Antibiotics Hives   • Ciprofloxacin Hives       Review of Systems   Constitutional: Negative for activity change, appetite change, fatigue and fever.   HENT: Positive for congestion, ear pain, rhinorrhea, sneezing and sore throat. Negative for swollen glands and voice change.    Eyes: Negative for visual disturbance.   Respiratory: Positive for cough. Negative for shortness of breath and wheezing.    Cardiovascular: Negative for chest pain and leg swelling.   Gastrointestinal: Negative for abdominal pain, blood in stool, constipation, diarrhea, nausea and vomiting.   Endocrine: Negative for polydipsia and polyuria.   Genitourinary: Negative for dysuria, frequency and hematuria.   Musculoskeletal: Positive for joint pain. Negative for joint swelling, neck pain and neck stiffness.   Skin: Negative for rash and bruise.   Neurological: Negative for weakness, numbness and headache.   Psychiatric/Behavioral: Negative for suicidal ideas and depressed mood.     I have reviewed and confirmed the accuracy of the ROS as documented by the MA/LPN/RN  "Nazario Mcdonald MD    Objective   Visit Vitals  /71 (BP Location: Right arm, Patient Position: Sitting, Cuff Size: Adult)   Pulse 118   Temp 98 °F (36.7 °C)   Resp 18   Ht 162.6 cm (64\")   Wt 64.7 kg (142 lb 9.6 oz)   SpO2 98%   BMI 24.48 kg/m²       Physical Exam  Constitutional:       Appearance: She is well-developed.   HENT:      Head: Normocephalic and atraumatic.      Left Ear: External ear normal. A middle ear effusion is present. Tympanic membrane is bulging.      Nose: Congestion present.   Eyes:      Pupils: Pupils are equal, round, and reactive to light.   Cardiovascular:      Rate and Rhythm: Normal rate and regular rhythm.      Heart sounds: Normal heart sounds.   Pulmonary:      Effort: Pulmonary effort is normal.   Abdominal:      General: Bowel sounds are normal.      Palpations: Abdomen is soft.   Musculoskeletal:         General: Normal range of motion.      Cervical back: Normal range of motion and neck supple.   Skin:     General: Skin is warm and dry.   Neurological:      Mental Status: She is alert and oriented to person, place, and time.   Psychiatric:         Behavior: Behavior normal.         Thought Content: Thought content normal.         Judgment: Judgment normal.         Assessment/Plan .      Diagnoses and all orders for this visit:    1. Viral upper respiratory tract infection (Primary)  -     azithromycin (ZITHROMAX) 250 MG tablet; Take 2 tablets the first day, then 1 tablet daily for 4 days.  Dispense: 6 tablet; Refill: 0  -     methylPREDNISolone (MEDROL) 4 MG dose pack; 6 tablets on day one, 5 tablets on day two, 4 tablets on day three, 3 tablets on day four, 2 tablets on day five, 1 tablet on day 6.  Dispense: 21 tablet; Refill: 0  -     fluconazole (Diflucan) 150 MG tablet; Take 1 tablet by mouth 1 (One) Time for 1 dose.  Dispense: 1 tablet; Refill: 0    2. Cough with exposure to COVID-19 virus  -     azithromycin (ZITHROMAX) 250 MG tablet; Take 2 tablets the first " day, then 1 tablet daily for 4 days.  Dispense: 6 tablet; Refill: 0  -     methylPREDNISolone (MEDROL) 4 MG dose pack; 6 tablets on day one, 5 tablets on day two, 4 tablets on day three, 3 tablets on day four, 2 tablets on day five, 1 tablet on day 6.  Dispense: 21 tablet; Refill: 0  -     fluconazole (Diflucan) 150 MG tablet; Take 1 tablet by mouth 1 (One) Time for 1 dose.  Dispense: 1 tablet; Refill: 0       Findings discussed. All questions answered.  Medication and medication adverse effects discussed.  Drug education given and explained to patient. Patient verbalized understanding.  Follow-up in approximately 3 days for reevaluation if not improved.  Follow-up sooner for worsening symptoms or any concerns. Symptoms, warning signs for Covid-19 reviewed. Seek appropriate medical care if clinical condition warrants.     I wore protective equipment throughout this patient encounter to include mask and gloves. Hand hygiene was performed before donning protective equipment and after removal when leaving the room

## 2021-08-20 ENCOUNTER — TELEPHONE (OUTPATIENT)
Dept: FAMILY MEDICINE CLINIC | Facility: CLINIC | Age: 31
End: 2021-08-20

## 2021-08-20 DIAGNOSIS — F41.9 ANXIETY: Primary | ICD-10-CM

## 2021-08-20 RX ORDER — CITALOPRAM 20 MG/1
20 TABLET ORAL DAILY
Qty: 30 TABLET | Refills: 12 | Status: SHIPPED | OUTPATIENT
Start: 2021-08-20 | End: 2021-10-25

## 2021-08-20 NOTE — TELEPHONE ENCOUNTER
PATIENT CALLED TO SEE IF DR NUNEZ RECEIVED HER COLONOSCOPY RESULTS. PATIENT STATED SHE WAS DIAGNOSED WITH IBS     CALL BACK   [phone]

## 2021-08-20 NOTE — TELEPHONE ENCOUNTER
Caller: Evangelist Mosher    Relationship: Self    Best call back number:[phone]     What medication are you requesting: DEPRESSION MEDICATION. PATIENT STATES SHE TALKED TO DR NUNEZ AT LAST VISIT AND HAS BEEN USING AN HERBAL MEDICATION, BUT SHE THINKS SHE NEEDS SOMETHING STRONGER       Have you had these symptoms before:    [x] Yes  [] No    Have you been treated for these symptoms before:   [x] Yes  [] No    If a prescription is needed, what is your preferred pharmacy and phone number:    [facility]  [place], [place] - [address] [facility] [address]  - [phone] [facility] [phone] FX

## 2021-08-23 ENCOUNTER — TELEPHONE (OUTPATIENT)
Dept: FAMILY MEDICINE CLINIC | Facility: CLINIC | Age: 31
End: 2021-08-23

## 2021-08-23 DIAGNOSIS — H92.03 OTALGIA OF BOTH EARS: Primary | ICD-10-CM

## 2021-08-23 NOTE — TELEPHONE ENCOUNTER
Caller: Evangelist Mosher    Relationship: Self    Best call back number: [phone]    What medication are you requesting: EAR DROPS    What are your current symptoms: EAR PAIN     How long have you been experiencing symptoms: 3 DAYS     Have you had these symptoms before:    [x] Yes  [] No    Have you been treated for these symptoms before:   [x] Yes  [] No    If a prescription is needed, what is your preferred pharmacy and phone number:    [facility] [place] [facility] [address]  - [phone] [facility] [phone] FX       Additional notes: PATIENT HAS COVID AND WOULD LIKE A PRESCRIPTION CALLED IN WITHOUT HAVING TO MAKE APPT.

## 2021-08-25 ENCOUNTER — TELEPHONE (OUTPATIENT)
Dept: FAMILY MEDICINE CLINIC | Facility: CLINIC | Age: 31
End: 2021-08-25

## 2021-08-25 NOTE — TELEPHONE ENCOUNTER
Caller: Evangelist Mosher    Relationship: Self    Best call back number: [phone]    What form or medical record are you requesting: COVID RETURN TO WORK LETTER    Who is requesting this form or medical record from you: PATIENT     How would you like to receive the form or medical records (pick-up, mail, fax):     If pick-up, provide patient with address and location details    Timeframe paperwork needed: AS SOON AS POSSIBLE    Additional notes: PATIENT SAID THAT SHE TESTED POSITIVE FOR COVID ON 8/19/21. HER EMPLOYER WANTS HER TO RETURN TO WORK THIS WEEKEND BEFORE HER 10-DAY QUARANTINE IS OVER. SHE ASKED FOR A LETTER FROM DR. NUNEZ STATING WHEN SHE CAN RETURN TO WORK. SHE SAID THAT HER MOTHER WILL COME BY THE OFFICE TO  LETTER.

## 2021-08-30 ENCOUNTER — TELEPHONE (OUTPATIENT)
Dept: FAMILY MEDICINE CLINIC | Facility: CLINIC | Age: 31
End: 2021-08-30

## 2021-08-30 NOTE — TELEPHONE ENCOUNTER
Caller: Evangelist Mosher    Relationship: Self    Best call back number: [phone]    What medications are you currently taking:   Current Outpatient Medications on File Prior to Visit   Medication Sig Dispense Refill   • azithromycin (ZITHROMAX) 250 MG tablet Take 2 tablets the first day, then 1 tablet daily for 4 days. 6 tablet 0   • busPIRone (BUSPAR) 7.5 MG tablet TAKE 1 TABLET BY MOUTH ONCE DAILY FOR 7 DAYS AND  THEN  ONE  TAB  TWO  TIMES  A  DAY 60 tablet 12   • cetirizine (zyrTEC) 10 MG tablet Take 10 mg by mouth Daily.     • citalopram (CeleXA) 20 MG tablet Take 1 tablet by mouth Daily. 30 tablet 12   • EluRyng 0.12-0.015 MG/24HR vaginal ring      • fexofenadine-pseudoephedrine (ALLEGRA-D 24) 180-240 MG per 24 hr tablet Take 1 tablet by mouth Daily.     • methylPREDNISolone (MEDROL) 4 MG dose pack 6 tablets on day one, 5 tablets on day two, 4 tablets on day three, 3 tablets on day four, 2 tablets on day five, 1 tablet on day 6. 21 tablet 0   • montelukast (SINGULAIR) 10 MG tablet Take 10 mg by mouth Every Night.     • neomycin-polymyxin-hydrocortisone (CORTISPORIN) 3.5-82692-2 otic solution Administer 4 drops into both ears 4 (Four) Times a Day. 10 mL 0   • nitrofurantoin, macrocrystal-monohydrate, (MACROBID) 100 MG capsule TAKE 1 CAPSULE BY MOUTH AFTER EACH BLADDER INSTILLATION FOR 10 DAYS     • nystatin-triamcinolone (MYCOLOG) 603141-1.1 UNIT/GM-% ointment 2 (Two) Times a Day As Needed.     • omeprazole (priLOSEC) 20 MG capsule Take 1 capsule by mouth Daily. 30 capsule 3   • sorbitol 70 % solution solution See Admin Instructions.     • valACYclovir (VALTREX) 1000 MG tablet Take 1,000 mg by mouth 2 (Two) Times a Day. for 7 days       No current facility-administered medications on file prior to visit.      When did you start taking these medications: 08/20    Which medication are you concerned about: CELEXA 20 MG     Who prescribed you this medication: DR GLASS     What are your concerns: PATIENT CALLED  STATING SHE HAS A RASH FROM THE BACK OF HER NECK OVER HER SHOULDERS AND ON HER CHEST. PATIENT BELIEVES IT TO BE FROM THE MEDICATION. PATIENT STATES THE RASH DOES NOT ITCH, IT IS RAISED AND RED. PATIENT IS STILL TAKING MEDICATION     How long have you had these concerns: 4 DAYS AGO

## 2021-10-14 ENCOUNTER — FLU SHOT (OUTPATIENT)
Dept: FAMILY MEDICINE CLINIC | Facility: CLINIC | Age: 31
End: 2021-10-14

## 2021-10-14 DIAGNOSIS — Z23 NEED FOR INFLUENZA VACCINATION: Primary | ICD-10-CM

## 2021-10-14 PROCEDURE — 90471 IMMUNIZATION ADMIN: CPT | Performed by: FAMILY MEDICINE

## 2021-10-14 PROCEDURE — 90686 IIV4 VACC NO PRSV 0.5 ML IM: CPT | Performed by: FAMILY MEDICINE

## 2021-10-25 ENCOUNTER — OFFICE VISIT (OUTPATIENT)
Dept: FAMILY MEDICINE CLINIC | Facility: CLINIC | Age: 31
End: 2021-10-25

## 2021-10-25 VITALS
OXYGEN SATURATION: 98 % | WEIGHT: 146.6 LBS | HEART RATE: 76 BPM | HEIGHT: 64 IN | BODY MASS INDEX: 25.03 KG/M2 | SYSTOLIC BLOOD PRESSURE: 124 MMHG | TEMPERATURE: 98.2 F | DIASTOLIC BLOOD PRESSURE: 76 MMHG | RESPIRATION RATE: 18 BRPM

## 2021-10-25 DIAGNOSIS — F41.9 ANXIETY: Primary | ICD-10-CM

## 2021-10-25 DIAGNOSIS — R41.840 DISTURBED CONCENTRATION: ICD-10-CM

## 2021-10-25 DIAGNOSIS — M25.551 RIGHT HIP PAIN: ICD-10-CM

## 2021-10-25 PROCEDURE — 99213 OFFICE O/P EST LOW 20 MIN: CPT | Performed by: FAMILY MEDICINE

## 2021-10-25 RX ORDER — CITALOPRAM 20 MG/1
20 TABLET ORAL DAILY
Qty: 30 TABLET | Refills: 12 | Status: SHIPPED | OUTPATIENT
Start: 2021-10-25

## 2021-10-25 NOTE — PROGRESS NOTES
Subjective   Evangelist Mosher is a 31 y.o. female. Presents to [facility]    Chief Complaint   Patient presents with   • Hip Pain   • ADHD       ADHD:  Symptoms include inability to follow directions and inattention. The symptoms occur at home, at school and at work. 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 The symptoms are described as Mild in severity. The symptoms are described as gradually worsening. Symptoms are exacerbated by fatigue and distracting activities. Symptoms are relieved by nothing. Associated symptoms include anxiety disorder. Current treatment includes none.       Hip Pain   The incident occurred more than 1 week ago. There was no injury mechanism. The pain is present in the right hip. The quality of the pain is described as aching. Pertinent negatives include no inability to bear weight, loss of motion, loss of sensation, muscle weakness, numbness or tingling. Associated symptoms comments: Swelling    . She reports no foreign bodies present. The symptoms are aggravated by movement. Treatments tried: ibuprfoen. The treatment provided moderate relief.        I personally reviewed and updated the patient's allergies, medications, problem list, and past medical, surgical, social, and family history. I have reviewed and confirmed the accuracy of the History of Present Illness and Review of Symptoms as documented by the MA/LPN/RN. Tanja Caro MD    Allergies:  Allergies   Allergen Reactions   • Ceftriaxone Hives, Itching, Rash and Swelling   • Sulfa Antibiotics Hives   • Ciprofloxacin Hives       Social History:  Social History     Socioeconomic History   • Marital status: Single   Tobacco Use   • Smoking status: Never Smoker   • Smokeless tobacco: Never Used   Substance and Sexual Activity   • Alcohol use: No   • Drug use: No       Family History:  Family History   Problem Relation Age of Onset   •  Diabetes Maternal Grandfather         mellitus   • Heart disease Maternal Grandfather         ischemic   • Cancer Paternal Grandfather         ?   • Heart disease Paternal Grandfather         ischemic   • Bipolar disorder Cousin        Past Medical History :  Patient Active Problem List   Diagnosis   • Acute reaction to stress   • Anxiety   • Elevated C-reactive protein   • Fibrocystic breast disease   • History of varicella   • Other bladder disorder   • Other specific developmental learning difficulties   • Seasonal allergic rhinitis due to pollen   • Vesicoureteral reflux   • Miliaria rubra   • Borderline open-angle glaucoma   • Itchy scalp   • Pruritus   • GERD (gastroesophageal reflux disease)   • Alternating constipation and diarrhea   • Herpes simplex of female genitalia   • Encounter for general adult medical examination with abnormal findings   • Screening for hyperlipidemia   • Cough with exposure to COVID-19 virus   • Disturbed concentration   • Right hip pain       Medication List:    Current Outpatient Medications:   •  EluRyng 0.12-0.015 MG/24HR vaginal ring, , Disp: , Rfl:   •  fexofenadine-pseudoephedrine (ALLEGRA-D 24) 180-240 MG per 24 hr tablet, Take 1 tablet by mouth Daily., Disp: , Rfl:   •  montelukast (SINGULAIR) 10 MG tablet, Take 10 mg by mouth Every Night., Disp: , Rfl:   •  citalopram (CeleXA) 20 MG tablet, Take 1 tablet by mouth Daily., Disp: 30 tablet, Rfl: 12    Past Surgical History:  No past surgical history on file.    Review of Systems:  Review of Systems   Constitutional: Negative for activity change and fever.   HENT: Negative for ear pain, rhinorrhea, sinus pressure and voice change.    Eyes: Negative for visual disturbance.   Respiratory: Negative for cough and shortness of breath.    Cardiovascular: Negative for chest pain.   Gastrointestinal: Negative for abdominal pain, diarrhea, nausea and vomiting.   Endocrine: Negative for cold intolerance and heat intolerance.  "  Genitourinary: Negative for frequency and urgency.   Musculoskeletal: Negative for arthralgias.   Skin: Negative for rash.   Neurological: Negative for tingling, syncope and numbness.   Hematological: Does not bruise/bleed easily.   Psychiatric/Behavioral: Negative for depressed mood. The patient is not nervous/anxious.        Physical Exam:  Vital Signs:  Vital Signs:   /76   Pulse 76   Temp 98.2 °F (36.8 °C)   Resp 18   Ht 162.6 cm (64\")   Wt 66.5 kg (146 lb 9.6 oz)   SpO2 98%   BMI 25.16 kg/m²     Result Review :                Physical Exam  Vitals reviewed.   Constitutional:       Appearance: Normal appearance. She is well-developed.   HENT:      Head: Normocephalic and atraumatic.   Eyes:      General:         Right eye: No discharge.         Left eye: No discharge.   Cardiovascular:      Rate and Rhythm: Normal rate and regular rhythm.      Heart sounds: Normal heart sounds. No murmur heard.  No friction rub. No gallop.    Pulmonary:      Effort: Pulmonary effort is normal. No respiratory distress.      Breath sounds: Normal breath sounds. No wheezing or rales.   Musculoskeletal:      Right hip: Normal. No tenderness. Normal range of motion.      Comments: Tender on right IT band   Skin:     General: Skin is warm and dry.      Findings: No rash.   Neurological:      Mental Status: She is alert and oriented to person, place, and time.      Coordination: Coordination normal.      Gait: Gait normal.   Psychiatric:         Behavior: Behavior is cooperative.         Assessment and Plan:  Problems Addressed this Visit        Mental Health    Anxiety - Primary     Celexa is helping  refilled         Relevant Medications    citalopram (CeleXA) 20 MG tablet       Musculoskeletal and Injuries    Right hip pain     Ice, nsaids and stretches discussed.             Neuro    Disturbed concentration     To get tested soon           Diagnoses       Codes Comments    Anxiety    -  Primary ICD-10-CM: " F41.9  ICD-9-CM: 300.00     Disturbed concentration     ICD-10-CM: R41.840  ICD-9-CM: 799.51     Right hip pain     ICD-10-CM: M25.551  ICD-9-CM: 719.45            An After Visit Summary and PPPS were given to the patient.       I wore protective equipment throughout this patient encounter to include mask and eye protection. Hand hygiene was performed before donning protective equipment and after removal when leaving the room.

## 2021-12-28 ENCOUNTER — TELEPHONE (OUTPATIENT)
Dept: FAMILY MEDICINE CLINIC | Facility: CLINIC | Age: 31
End: 2021-12-28

## 2021-12-28 DIAGNOSIS — M25.551 RIGHT HIP PAIN: Primary | ICD-10-CM

## 2021-12-28 NOTE — TELEPHONE ENCOUNTER
Caller: Evangelist Mosher    Relationship: Self    Best call back number: [phone]    What orders are you requesting (i.e. lab or imaging): PHYSICAL THERAPY FOR RIGHT HIP TO KNEE    In what timeframe would the patient need to come in: ASAP     Additional notes: PATIENT WAS SEEN IN OFFICE October 2021, DID NOT HAVE INSURANCE TO COVER PHYSICAL THERAPY. PATIENT NOW HAS INSURANCE AND IS ASKING FOR THERAPY TO BE SET UP.  PLEASE ADVISE PATIENT

## 2022-01-06 ENCOUNTER — OFFICE VISIT (OUTPATIENT)
Dept: FAMILY MEDICINE CLINIC | Facility: CLINIC | Age: 32
End: 2022-01-06

## 2022-01-06 VITALS
OXYGEN SATURATION: 96 % | SYSTOLIC BLOOD PRESSURE: 120 MMHG | HEIGHT: 64 IN | RESPIRATION RATE: 16 BRPM | DIASTOLIC BLOOD PRESSURE: 72 MMHG | TEMPERATURE: 97.5 F | HEART RATE: 82 BPM | BODY MASS INDEX: 24.72 KG/M2 | WEIGHT: 144.8 LBS

## 2022-01-06 DIAGNOSIS — H65.192 ACUTE EFFUSION OF LEFT EAR: Primary | ICD-10-CM

## 2022-01-06 DIAGNOSIS — H69.92 EUSTACHIAN TUBE DISORDER, LEFT: ICD-10-CM

## 2022-01-06 DIAGNOSIS — R09.81 NASAL CONGESTION: ICD-10-CM

## 2022-01-06 PROCEDURE — 99213 OFFICE O/P EST LOW 20 MIN: CPT | Performed by: FAMILY MEDICINE

## 2022-01-06 RX ORDER — FLUTICASONE PROPIONATE 50 MCG
2 SPRAY, SUSPENSION (ML) NASAL DAILY
Qty: 16 G | Refills: 5 | Status: SHIPPED | OUTPATIENT
Start: 2022-01-06

## 2022-01-06 RX ORDER — PREDNISONE 20 MG/1
20 TABLET ORAL 2 TIMES DAILY
Qty: 14 TABLET | Refills: 0 | Status: SHIPPED | OUTPATIENT
Start: 2022-01-06 | End: 2022-01-13

## 2022-01-06 RX ORDER — AZELASTINE HCL 205.5 UG/1
2 SPRAY NASAL 2 TIMES DAILY
Qty: 30 ML | Refills: 0 | Status: SHIPPED | OUTPATIENT
Start: 2022-01-06

## 2022-01-06 RX ORDER — VALACYCLOVIR HYDROCHLORIDE 500 MG/1
500 TABLET, FILM COATED ORAL DAILY
COMMUNITY
Start: 2021-12-30

## 2022-01-06 NOTE — PROGRESS NOTES
Chief Complaint   Patient presents with   • Earache     left       Subjective   Evangelist Mosher is a 31 y.o. female.     Earache   There is pain in the left ear. This is a new problem. Episode onset: x 2 weeks. The problem occurs constantly. The problem has been gradually worsening. There has been no fever. The pain is at a severity of 2/10. The pain is mild. Associated symptoms include coughing (dry). Pertinent negatives include no ear discharge. She has tried nothing for the symptoms.        Past Medical History :  Active Ambulatory Problems     Diagnosis Date Noted   • Acute reaction to stress 09/27/2019   • Anxiety 09/27/2019   • Elevated C-reactive protein 09/27/2019   • Fibrocystic breast disease 09/27/2019   • History of varicella 12/23/1993   • Other bladder disorder 09/27/2019   • Other specific developmental learning difficulties 09/27/2019   • Seasonal allergic rhinitis due to pollen 09/27/2019   • Vesicoureteral reflux 09/27/2019   • Miliaria rubra 06/04/2020   • Borderline open-angle glaucoma 09/06/2019   • Itchy scalp 09/14/2020   • Pruritus 09/14/2020   • GERD (gastroesophageal reflux disease) 05/27/2021   • Alternating constipation and diarrhea 06/17/2021   • Herpes simplex of female genitalia 07/26/2021   • Encounter for general adult medical examination with abnormal findings 07/26/2021   • Screening for hyperlipidemia 07/26/2021   • Cough with exposure to COVID-19 virus 08/12/2021   • Disturbed concentration 10/25/2021   • Right hip pain 10/25/2021     Resolved Ambulatory Problems     Diagnosis Date Noted   • Allergic drug reaction 09/27/2019   • Diarrhea 05/27/2021     Past Medical History:   Diagnosis Date   • Acne varioliformis    • Acute bilateral low back pain without sciatica .   • Acute maxillary sinusitis    • Acute pain of both knees    • Allergic    • Allergic reaction to drug    • Annual physical exam    • Benign familial tremor    • Benign skin lesion    • Bladder trabeculation    •  "Candidal vaginitis    • Central auditory processing disorder    • Cervical cancer screening    • Depression    • Difficulty breathing    • Dizziness    • Encounter for removal of sutures    • History of chicken pox    • Irregular menstrual cycle    • Lentiginous compound nevus of abdominal wall    • Malaise and fatigue    • Overweight (BMI 25.0-29.9)    • Reactive airway disease without complication    • Screening for thyroid disorder    • Tobacco use disorder        Medication List:    Current Outpatient Medications:   •  citalopram (CeleXA) 20 MG tablet, Take 1 tablet by mouth Daily., Disp: 30 tablet, Rfl: 12  •  fexofenadine-pseudoephedrine (ALLEGRA-D 24) 180-240 MG per 24 hr tablet, Take 1 tablet by mouth Daily., Disp: , Rfl:   •  valACYclovir (VALTREX) 500 MG tablet, Take 500 mg by mouth Daily., Disp: , Rfl:       Allergies   Allergen Reactions   • Ceftriaxone Hives, Itching, Rash and Swelling   • Sulfa Antibiotics Hives   • Ciprofloxacin Hives       Social History     Tobacco Use   • Smoking status: Never Smoker   • Smokeless tobacco: Never Used   Substance Use Topics   • Alcohol use: No       Review of Systems   HENT: Positive for ear pain. Negative for ear discharge.    Respiratory: Positive for cough (dry).          Objective   Vitals:    01/06/22 1459   BP: 120/72   BP Location: Right arm   Patient Position: Sitting   Cuff Size: Adult   Pulse: 82   Resp: 16   Temp: 97.5 °F (36.4 °C)   TempSrc: Skin   SpO2: 96%   Weight: 65.7 kg (144 lb 12.8 oz)   Height: 162.6 cm (64\")     Body mass index is 24.85 kg/m².    Physical Exam  Constitutional:       General: She is not in acute distress.     Appearance: She is not ill-appearing, toxic-appearing or diaphoretic.   HENT:      Head: Normocephalic and atraumatic.      Right Ear: Ear canal and external ear normal. Tympanic membrane is not perforated or erythematous.      Left Ear: Ear canal and external ear normal. No drainage, swelling or tenderness. A middle ear " effusion is present. Tympanic membrane is not perforated or erythematous.      Nose: Nose normal.      Right Sinus: No maxillary sinus tenderness or frontal sinus tenderness.      Left Sinus: No maxillary sinus tenderness or frontal sinus tenderness.      Mouth/Throat:      Mouth: Mucous membranes are moist.      Pharynx: No oropharyngeal exudate.   Eyes:      General: Lids are normal.         Right eye: No discharge.         Left eye: No discharge.      Extraocular Movements: Extraocular movements intact.      Conjunctiva/sclera: Conjunctivae normal.   Cardiovascular:      Rate and Rhythm: Normal rate and regular rhythm.      Heart sounds: Normal heart sounds.   Pulmonary:      Effort: Pulmonary effort is normal.      Breath sounds: Normal breath sounds. No wheezing, rhonchi or rales.   Musculoskeletal:      Cervical back: Normal range of motion and neck supple.   Lymphadenopathy:      Cervical: No cervical adenopathy.   Skin:     General: Skin is warm and dry.      Findings: No rash.   Neurological:      General: No focal deficit present.      Mental Status: She is alert and oriented to person, place, and time.         Assessment/Plan     Diagnoses and all orders for this visit:    1. Acute effusion of left ear (Primary)  -     predniSONE (DELTASONE) 20 MG tablet; Take 1 tablet by mouth 2 (Two) Times a Day for 7 days.  Dispense: 14 tablet; Refill: 0  -     fluticasone (Flonase) 50 MCG/ACT nasal spray; 2 sprays into the nostril(s) as directed by provider Daily.  Dispense: 16 g; Refill: 5    2. Nasal congestion  -     azelastine (ASTEPRO) 0.15 % solution nasal spray; 2 sprays into the nostril(s) as directed by provider 2 (Two) Times a Day.  Dispense: 30 mL; Refill: 0    3. Eustachian tube disorder, left      Medication and medication adverse effects discussed.  Drug education given and explained to patient. Patient verbalized understanding.  All questions presented by patient addressed.    Return if symptoms worsen  or fail to improve.       Patient was given instructions and counseling regarding his/her condition or for health maintenance advice. Please see specific information pulled into the AVS if appropriate.     I wore protective equipment throughout this patient encounter to include mask. Hand hygiene was performed before donning protective equipment and after removal when leaving the room.

## 2022-01-14 ENCOUNTER — TREATMENT (OUTPATIENT)
Dept: PHYSICAL THERAPY | Facility: CLINIC | Age: 32
End: 2022-01-14

## 2022-01-14 DIAGNOSIS — M70.61 TROCHANTERIC BURSITIS OF RIGHT HIP: ICD-10-CM

## 2022-01-14 DIAGNOSIS — M25.551 RIGHT HIP PAIN: Primary | ICD-10-CM

## 2022-01-14 PROCEDURE — 97161 PT EVAL LOW COMPLEX 20 MIN: CPT | Performed by: PHYSICAL THERAPIST

## 2022-01-14 PROCEDURE — 97110 THERAPEUTIC EXERCISES: CPT | Performed by: PHYSICAL THERAPIST

## 2022-01-14 PROCEDURE — 97535 SELF CARE MNGMENT TRAINING: CPT | Performed by: PHYSICAL THERAPIST

## 2022-01-14 NOTE — PROGRESS NOTES
Physical Therapy Initial Evaluation and Plan of Care    Patient: Evangelist Mosher   : 1990  Diagnosis/ICD-10 Code:  Right hip pain [M25.551]  Referring practitioner: Tanja Caro MD  Date of Initial Visit: 2022  Today's Date: 2022  Patient seen for 1 sessions           Subjective Questionnaire: [place] Hip: 40/48      Subjective Evaluation    History of Present Illness  Date of onset: 10/14/2021  Mechanism of injury: Evangelist reports she's having pain between her R hip and knee. She's gone to the chiropractor for over a year for the R leg pain.       Patient Occupation: nurse Pain  At best pain ratin  At worst pain ratin  Quality: dull ache    Social Support  Lives in: multiple-level home  Lives with: parents    Hand dominance: right    Treatments  Previous treatment: chiropractic  Current treatment: medication and physical therapy  Patient Goals  Patient goals for therapy: decreased pain             Objective          Static Posture     Pelvis   Pelvis (Right): Elevated.     Tenderness     Left Hip   No tenderness in the greater trochanter.     Right Hip   Tenderness in the greater trochanter.     Additional Tenderness Details  Tenderness to R ITB      Strength/Myotome Testing     Left Hip   Planes of Motion   Abduction: 4+    Right Hip   Planes of Motion   Abduction: 3+    Tests     Left Hip   Negative Lenka.     Right Hip   Positive Lenka.       Assessment & Plan     Assessment  Impairments: abnormal muscle firing, abnormal muscle tone, abnormal or restricted ROM, impaired physical strength, lacks appropriate home exercise program and pain with function  Functional Limitations: walking and uncomfortable because of pain  Assessment details: Pt is a 30 yo female presenting to OP PT w/c/o R lat thigh & hip pain. Acute on chronic. She exhibits R pelvic elevation, weak R glute med, and stiff/short R ITB. S/S are consistent with ITB syndrome resulting in trochanteric bursitis.   She was  provided HEP and prefers to focus on this as her insurance runs out at the end of this month. Pt is in nursing school. She is an appropriate candidate for OP PT and will benefit from skilled services.   Prognosis: good    Goals  Plan Goals: ST. Pt will demonstrate compliance, independence, and safety with HEP for optimized recovery in 2 wks.  2. Pt will tolerate at least walking around the house with no greater than moderate pain in R thigh/hip for improved QOL in 2 wks  LT. Pt will improve [place] hip score from 40 initially to at least 45 by DC to reflect improved activity tolerance and participation.  2. Pt will report absence of shooting pain in RLE for improved QOL by   DC  3. Pt will report absence of pain troubling her at night for optimized recovery by DC.      Plan  Therapy options: will be seen for skilled therapy services  Planned modality interventions: cryotherapy, dry needling, electrical stimulation/Russian stimulation, high voltage pulsed current (pain management), high voltage pulsed current (spasm management), TENS, thermotherapy (hydrocollator packs) and ultrasound  Frequency: 2x week  Duration in weeks: 6  Treatment plan discussed with: patient  Plan details: Advised pt of risks/benefits of kinesiotaping. Pt was advised to roll tape off skin to remove it after 2-5 days. She was instructed to seek medical care if s/s of skin irritation occur. She verbally consented to taping.        Timed:         Manual Therapy:   3     mins  41941;     Therapeutic Exercise:    15     mins  78004;     Neuromuscular Wanda:        mins  99272;    Therapeutic Activity:          mins  77505;     Gait Training:           mins  04782;     Ultrasound:          mins  00713;    Self-care  __5__ mins 16156    Un-Timed:  Electrical Stimulation:         mins  41197 ( );  Dry Needling          mins self-pay   Traction          mins 49241  Low Eval     15     Mins  83933  Mod Eval          Mins   08299  Canal repositioning _____ mins  51722        Timed Treatment:   23   mins   Total Treatment:     38   mins    PT SIGNATURE: Flaquita Morales PT, DPT, cert. DN  [place] # [id number]  Electronically signed by Flaquita Morales PT, 01/14/22, 9:56 AM EST    Initial Certification  Certification Period: 1/14/2022 through 4/13/2022  I certify that the therapy services are furnished while this patient is under my care.  The services outlined above are required by this patient, and will be reviewed every 90 days.     PHYSICIAN: Tanja Caro MD ______________________________________________________________________________________________     DATE: _________________________________________________________________________________________________________________________________    Please sign and return via fax to [phone]. Thank you, [facility].

## 2022-03-28 ENCOUNTER — TELEPHONE (OUTPATIENT)
Dept: FAMILY MEDICINE CLINIC | Facility: CLINIC | Age: 32
End: 2022-03-28

## 2022-03-28 NOTE — TELEPHONE ENCOUNTER
Patient called, stated that she moved and is in need of a note for an emotional support animal. She now lives in [place].     She would like a call back at the end of the day, due to the time difference.

## 2022-03-29 ENCOUNTER — TELEPHONE (OUTPATIENT)
Dept: FAMILY MEDICINE CLINIC | Facility: CLINIC | Age: 32
End: 2022-03-29

## 2022-03-29 NOTE — TELEPHONE ENCOUNTER
Please return call to patient regarding emotional support animal. She gave me her email address:  [email or web address]

## 2022-03-30 ENCOUNTER — TELEPHONE (OUTPATIENT)
Dept: FAMILY MEDICINE CLINIC | Facility: CLINIC | Age: 32
End: 2022-03-30

## 2023-05-08 ENCOUNTER — TELEPHONE (OUTPATIENT)
Dept: FAMILY MEDICINE CLINIC | Facility: CLINIC | Age: 33
End: 2023-05-08
Payer: COMMERCIAL

## 2023-05-08 NOTE — TELEPHONE ENCOUNTER
Caller: Evangelist Mcdonald    Relationship to patient: Self    Best call back number: [phone]   Patient is needing:     EVANGELIST WOULD LIKE IT DOCUMENTED IN HER CHART THAT SHE IS ALLERGIC TO SULFA, CIPRO, SHE STATES SHE IS ALLERIC TO A STEROID, ANTIBIOTIC BUT SHE DOES NOT KNOW THE NAME OF THESE MEDICATIONS

## 2023-09-08 ENCOUNTER — TELEPHONE (OUTPATIENT)
Dept: FAMILY MEDICINE CLINIC | Facility: CLINIC | Age: 33
End: 2023-09-08
Payer: OTHER GOVERNMENT

## 2023-09-08 NOTE — TELEPHONE ENCOUNTER
Caller: Evangelist Mcdonald    Relationship: Self    Best call back number: [phone]  OR CAN CALL MOTHER [phone]     What orders are you requesting (i.e. lab or imaging): EVANGELIST WOULD LIKE TO KNOW IF SHE HAS HAS THE TITER FOR CHICKEN POX AND IF SO SHE IS NEEDING A COPY FOR HER SCHOOL

## 2023-09-19 NOTE — PROGRESS NOTES
Subjective   Evangelist Mcdonald is a 33 y.o. female. Presents to [facility]    Chief Complaint   Patient presents with    need for TB test       History of Present Illness  Patient is here to get TDAP vaccine and TB skin test for school.   Rash  This is a chronic problem. The current episode started more than 1 year ago. The problem is unchanged. Location: genitals. The rash is characterized by blistering. She was exposed to nothing. Pertinent negatives include no fever. Treatments tried: antivirals. The treatment provided significant relief.      I personally reviewed and updated the patient's allergies, medications, problem list, and past medical, surgical, social, and family history. I have reviewed and confirmed the accuracy of the History of Present Illness and Review of Symptoms as documented by the MA/LPN/RN. Tanja Caro MD    Allergies:  Allergies   Allergen Reactions    Ceftriaxone Hives, Itching, Rash and Swelling    Sulfa Antibiotics Hives    Ciprofloxacin Hives       Social History:  Social History     Socioeconomic History    Marital status: Single   Tobacco Use    Smoking status: Never    Smokeless tobacco: Never   Substance and Sexual Activity    Alcohol use: No    Drug use: No       Family History:  Family History   Problem Relation Age of Onset    Diabetes Maternal Grandfather         mellitus    Heart disease Maternal Grandfather         ischemic    Cancer Paternal Grandfather         ?    Heart disease Paternal Grandfather         ischemic    Bipolar disorder Cousin        Past Medical History :  Patient Active Problem List   Diagnosis    Acute reaction to stress    Anxiety    Elevated C-reactive protein    Fibrocystic breast disease    History of varicella    Other bladder disorder    Other specific developmental learning difficulties    Seasonal allergic rhinitis due to pollen    Vesicoureteral reflux    Miliaria rubra    Borderline open-angle glaucoma    Itchy scalp     "Pruritus    GERD (gastroesophageal reflux disease)    Alternating constipation and diarrhea    Herpes simplex of female genitalia    Encounter for general adult medical examination with abnormal findings    Screening for hyperlipidemia    Cough with exposure to COVID-19 virus    Disturbed concentration    Right hip pain    Vestibulitis, vulvar    Type 3a perineal laceration    POTS (postural orthostatic tachycardia syndrome)    Frequency of micturition    Chronic interstitial cystitis    Need for Tdap vaccination    Visit for TB skin test       Medication List:    Current Outpatient Medications:     Prenatal Vit-DSS-Fe Cbn-FA (PRENATAL AD PO), Take  by mouth., Disp: , Rfl:     valACYclovir (VALTREX) 500 MG tablet, Take 1 tablet by mouth Daily., Disp: 30 tablet, Rfl: 12    Past Surgical History:  No past surgical history on file.      Physical Exam:      Vital Signs:    Vitals:    09/22/23 1322   BP: 112/64   Pulse: 72   Resp: 19   Temp: 97.1 °F (36.2 °C)        /64 (BP Location: Left arm, Patient Position: Sitting, Cuff Size: Adult)   Pulse 72   Temp 97.1 °F (36.2 °C) (Temporal)   Resp 19   Ht 162.6 cm (64\")   Wt 63 kg (139 lb)   LMP 09/11/2023   BMI 23.86 kg/m²     Wt Readings from Last 3 Encounters:   09/22/23 63 kg (139 lb)   01/06/22 65.7 kg (144 lb 12.8 oz)   10/25/21 66.5 kg (146 lb 9.6 oz)       Result Review :                Physical Exam  Vitals reviewed.   Constitutional:       Appearance: Normal appearance. She is well-developed.   HENT:      Head: Normocephalic and atraumatic.   Eyes:      General:         Right eye: No discharge.         Left eye: No discharge.   Cardiovascular:      Rate and Rhythm: Normal rate and regular rhythm.      Heart sounds: Normal heart sounds. No murmur heard.    No friction rub. No gallop.   Pulmonary:      Effort: Pulmonary effort is normal. No respiratory distress.      Breath sounds: Normal breath sounds. No wheezing or rales.   Skin:     General: Skin is " warm and dry.      Findings: No rash.   Neurological:      Mental Status: She is alert and oriented to person, place, and time.      Coordination: Coordination normal.      Gait: Gait normal.   Psychiatric:         Behavior: Behavior is cooperative.       Assessment and Plan:  Problems Addressed this Visit          Other    Herpes simplex of female genitalia     None currently    Diagnosis, treatment and and course discussed. Potential side effects discussed. Return if there is worsening or persistence of symptoms.              Relevant Medications    valACYclovir (VALTREX) 500 MG tablet    Visit for TB skin test - Primary    Relevant Orders    TB Skin Test (Completed)     Diagnoses         Codes Comments    Visit for TB skin test    -  Primary ICD-10-CM: Z11.1  ICD-9-CM: V74.1     Herpes simplex of female genitalia     ICD-10-CM: A60.09  ICD-9-CM: 054.10              BMI is within normal parameters. No other follow-up for BMI required.      An After Visit Summary and PPPS were given to the patient.

## 2023-09-22 ENCOUNTER — OFFICE VISIT (OUTPATIENT)
Dept: FAMILY MEDICINE CLINIC | Facility: CLINIC | Age: 33
End: 2023-09-22
Payer: OTHER GOVERNMENT

## 2023-09-22 VITALS
HEART RATE: 72 BPM | DIASTOLIC BLOOD PRESSURE: 64 MMHG | RESPIRATION RATE: 19 BRPM | TEMPERATURE: 97.1 F | WEIGHT: 139 LBS | BODY MASS INDEX: 23.73 KG/M2 | HEIGHT: 64 IN | SYSTOLIC BLOOD PRESSURE: 112 MMHG

## 2023-09-22 DIAGNOSIS — Z11.1 VISIT FOR TB SKIN TEST: Primary | ICD-10-CM

## 2023-09-22 DIAGNOSIS — A60.09 HERPES SIMPLEX OF FEMALE GENITALIA: ICD-10-CM

## 2023-09-22 PROBLEM — Z23 NEED FOR TDAP VACCINATION: Status: ACTIVE | Noted: 2023-09-22

## 2023-09-22 PROBLEM — G90.A POTS (POSTURAL ORTHOSTATIC TACHYCARDIA SYNDROME): Status: ACTIVE | Noted: 2018-01-01

## 2023-09-22 PROBLEM — R35.0 FREQUENCY OF MICTURITION: Status: ACTIVE | Noted: 2023-01-05

## 2023-09-22 PROBLEM — N30.10 CHRONIC INTERSTITIAL CYSTITIS: Status: ACTIVE | Noted: 2018-03-08

## 2023-09-22 PROBLEM — N94.810 VESTIBULITIS, VULVAR: Status: ACTIVE | Noted: 2018-11-20

## 2023-09-22 RX ORDER — VALACYCLOVIR HYDROCHLORIDE 500 MG/1
500 TABLET, FILM COATED ORAL DAILY
Qty: 30 TABLET | Refills: 12 | Status: SHIPPED | OUTPATIENT
Start: 2023-09-22

## 2023-09-22 RX ORDER — VALACYCLOVIR HYDROCHLORIDE 500 MG/1
500 TABLET, FILM COATED ORAL DAILY
Status: CANCELLED | OUTPATIENT
Start: 2023-09-22

## 2023-09-25 ENCOUNTER — CLINICAL SUPPORT (OUTPATIENT)
Dept: FAMILY MEDICINE CLINIC | Facility: CLINIC | Age: 33
End: 2023-09-25

## 2023-09-25 DIAGNOSIS — Z11.1 VISIT FOR TB SKIN TEST: Primary | ICD-10-CM

## 2023-09-25 LAB
INDURATION: 0 MM (ref 0–10)
Lab: NORMAL
Lab: NORMAL
TB SKIN TEST: NEGATIVE

## 2023-09-27 NOTE — ASSESSMENT & PLAN NOTE
None currently    Diagnosis, treatment and and course discussed. Potential side effects discussed. Return if there is worsening or persistence of symptoms.

## 2023-10-12 ENCOUNTER — TELEPHONE (OUTPATIENT)
Dept: FAMILY MEDICINE CLINIC | Facility: CLINIC | Age: 33
End: 2023-10-12

## 2023-10-12 NOTE — TELEPHONE ENCOUNTER
Caller: Evangelist Mcdonald    Relationship: Self    Best call back number: [phone]     What form or medical record are you requesting: EXTRA TESTING TIME FOR COLLEGE/THERE IS NOT A FORM BUT THEY ARE NEEDING SOMETHING FROM PCP ON LETTERHEAD.     Who is requesting this form or medical record from you: [facility]    How would you like to receive the form or medical records (pick-up, mail, fax): PATIENT WILL  WHEN DONE      Timeframe paperwork needed: NEEDING BEFORE 10/20    Additional notes: PLEASE CALL PATIENT AND ADVISE HOW BEST TO GET THIS DONE

## 2023-10-26 NOTE — PROGRESS NOTES
"  Chief Complaint   Patient presents with    Annual Exam         Subjective   Evangelist Mcdonald is a 33 y.o. female here for a Annual Visit.     Last Physical Exam: 07/26/2021 Previous physical was performed by  Tanja Caro MD  General Health:good  Contraceptive History:none  Nutrition:in general, a \"healthy\" diet    Exercise Level:not at all  Sleep:well  Hours of Sleep:8  Libido:fair  Self Skin Exam: monthly  Monthly Breast Self Exam:Performs monthly self breast exam    Pap Smear:  Last Completed Pap Smear       This patient has no relevant Health Maintenance data.         Findings on last pap: approximate date 03/10/17 and was normal} sees Dr. Andrew    Mammogram:   Last Completed Mammogram       This patient has no relevant Health Maintenance data.         she has never had a mammogram    Bone Dexa scan: None    Colonoscopy:   Last Completed Colonoscopy       This patient has no relevant Health Maintenance data.          None          I personally reviewed and updated the patient's allergies, medications, problem list, and past medical, surgical, social, and family history.     Allergies:  Allergies   Allergen Reactions    Ceftriaxone Hives, Itching, Rash and Swelling    Sulfa Antibiotics Hives    Ciprofloxacin Hives       Social History:  Social History     Socioeconomic History    Marital status: Single   Tobacco Use    Smoking status: Never    Smokeless tobacco: Never   Substance and Sexual Activity    Alcohol use: No    Drug use: No       Family History:  Family History   Problem Relation Age of Onset    Diabetes Maternal Grandfather         mellitus    Heart disease Maternal Grandfather         ischemic    Cancer Paternal Grandfather         ?    Heart disease Paternal Grandfather         ischemic    Bipolar disorder Cousin        Past Medical History :  Active Ambulatory Problems     Diagnosis Date Noted    Acute reaction to stress 09/27/2019    Anxiety 09/27/2019    Elevated C-reactive protein 09/27/2019 "    Fibrocystic breast disease 09/27/2019    History of varicella 12/23/1993    Other bladder disorder 09/27/2019    Other specific developmental learning difficulties 09/27/2019    Seasonal allergic rhinitis due to pollen 09/27/2019    Vesicoureteral reflux 09/27/2019    Miliaria rubra 06/04/2020    Borderline open-angle glaucoma 09/06/2019    Itchy scalp 09/14/2020    Pruritus 09/14/2020    GERD (gastroesophageal reflux disease) 05/27/2021    Alternating constipation and diarrhea 06/17/2021    Herpes simplex of female genitalia 07/26/2021    Encounter for general adult medical examination with abnormal findings 07/26/2021    Screening for hyperlipidemia 07/26/2021    Cough with exposure to COVID-19 virus 08/12/2021    Disturbed concentration 10/25/2021    Right hip pain 10/25/2021    Vestibulitis, vulvar 11/20/2018    Type 3a perineal laceration 05/23/2023    POTS (postural orthostatic tachycardia syndrome) 01/01/2018    Frequency of micturition 01/05/2023    Chronic interstitial cystitis 03/08/2018    Need for Tdap vaccination 09/22/2023    Visit for TB skin test 09/22/2023     Resolved Ambulatory Problems     Diagnosis Date Noted    Allergic drug reaction 09/27/2019    Diarrhea 05/27/2021     Past Medical History:   Diagnosis Date    Acne varioliformis     Acute bilateral low back pain without sciatica .    Acute maxillary sinusitis     Acute pain of both knees     Allergic     Allergic reaction to drug     Annual physical exam     Benign familial tremor     Benign skin lesion     Bladder trabeculation     Candidal vaginitis     Central auditory processing disorder     Cervical cancer screening     Depression     Difficulty breathing     Dizziness     Encounter for removal of sutures     History of chicken pox     Irregular menstrual cycle     Lentiginous compound nevus of abdominal wall     Malaise and fatigue     Overweight (BMI 25.0-29.9)     Reactive airway disease without complication     Screening for  "thyroid disorder     Tobacco use disorder        Medication List:    Current Outpatient Medications:     Prenatal Vit-DSS-Fe Cbn-FA (PRENATAL AD PO), Take  by mouth., Disp: , Rfl:     valACYclovir (VALTREX) 500 MG tablet, Take 1 tablet by mouth Daily., Disp: 30 tablet, Rfl: 12    Past Surgical History:  No past surgical history on file.    Depression Screen:       9/22/2023     1:25 PM   PHQ-2/PHQ-9 Depression Screening   Little Interest or Pleasure in Doing Things 0-->not at all   Feeling Down, Depressed or Hopeless 0-->not at all   PHQ-9: Brief Depression Severity Measure Score 0       Fall Risk Screen:  HODAADI Fall Risk Assessment has not been completed.        Physical Exam:  Vital Signs:  Visit Vitals  /70 (BP Location: Left arm, Patient Position: Sitting, Cuff Size: Adult)   Pulse 111   Temp 97.3 °F (36.3 °C) (Temporal)   Resp 18   Ht 162.6 cm (64\")   Wt 65.4 kg (144 lb 3.2 oz)   LMP 10/20/2023   SpO2 97% Comment: room air   Breastfeeding Yes   BMI 24.75 kg/m²       Body mass index is 24.75 kg/m².      Result Review :                  Assessment and Plan:  Problem List Items Addressed This Visit          Cardiac and Vasculature    Screening for hyperlipidemia    Relevant Orders    Lipid Panel With / Chol / HDL Ratio       Health Encounters    Encounter for general adult medical examination with abnormal findings - Primary    Relevant Orders    CBC & Differential    Comprehensive Metabolic Panel    TSH     Other Visit Diagnoses       Need for hepatitis C screening test        Relevant Orders    Hepatitis C Antibody            BMI is within normal parameters. No other follow-up for BMI required.       An After Visit Summary and PPPS were given to the patient.       Discussed injury prevention, diet and exercise, safe sexual practices, and screening for common diseases. Encouraged use of sunscreen and seatbelts. Discussed timing of  cervical cancer screening. Encouraged monthly self-breast exams, yearly " clinical breast exams, and discussed timing of mammograms. Avoidance of tobacco encouraged. Limitation or avoidance of alcohol encouraged. Recommend yearly dental and eye exams. Also discussed monitoring of blood pressure, lipids.         +++++E/M portion medically necessary secondary to new or uncontrolled chronic problem+++++++    Subjective   Evangelist Mcdonald is here for:    Chief Complaint   Patient presents with    Annual Exam       HPI      Physical Exam:  Review of Systems   Constitutional:  Negative for activity change and fever.   HENT:  Negative for ear pain, rhinorrhea, sinus pressure and voice change.    Eyes:  Negative for visual disturbance.   Respiratory:  Negative for cough and shortness of breath.    Cardiovascular:  Negative for chest pain.   Gastrointestinal:  Negative for abdominal pain, diarrhea, nausea and vomiting.   Endocrine: Negative for cold intolerance and heat intolerance.   Genitourinary:  Negative for frequency and urgency.   Musculoskeletal:  Negative for arthralgias.   Skin:  Negative for rash.   Neurological:  Negative for syncope.   Hematological:  Does not bruise/bleed easily.   Psychiatric/Behavioral:  Negative for depressed mood. The patient is not nervous/anxious.         Physical Exam  Vitals and nursing note reviewed.   Constitutional:       General: She is not in acute distress.     Appearance: She is well-developed. She is not diaphoretic.   HENT:      Head: Normocephalic and atraumatic.      Right Ear: Tympanic membrane and external ear normal.      Left Ear: Tympanic membrane and external ear normal.      Nose: Nose normal.      Mouth/Throat:      Pharynx: No oropharyngeal exudate.   Eyes:      General: No scleral icterus.        Right eye: No discharge.         Left eye: No discharge.      Conjunctiva/sclera: Conjunctivae normal.      Pupils: Pupils are equal, round, and reactive to light.   Neck:      Thyroid: No thyromegaly.      Trachea: No tracheal deviation.    Cardiovascular:      Rate and Rhythm: Normal rate and regular rhythm.      Heart sounds: Normal heart sounds. No murmur heard.     No friction rub. No gallop.   Pulmonary:      Effort: Pulmonary effort is normal. No respiratory distress.      Breath sounds: Normal breath sounds. No stridor. No wheezing or rales.   Abdominal:      General: Bowel sounds are normal. There is no distension.      Palpations: Abdomen is soft. There is no mass.      Tenderness: There is no abdominal tenderness. There is no guarding or rebound.   Musculoskeletal:         General: No tenderness or deformity. Normal range of motion.      Cervical back: Normal range of motion and neck supple.   Lymphadenopathy:      Cervical: No cervical adenopathy.   Skin:     General: Skin is warm and dry.      Capillary Refill: Capillary refill takes less than 2 seconds.      Coloration: Skin is not pale.      Findings: No erythema or rash.   Neurological:      Mental Status: She is alert and oriented to person, place, and time.      Cranial Nerves: No cranial nerve deficit.      Sensory: No sensory deficit.      Motor: No tremor, atrophy or abnormal muscle tone.      Coordination: Coordination normal.      Gait: Gait normal.      Deep Tendon Reflexes: Reflexes are normal and symmetric. Reflexes normal.   Psychiatric:         Behavior: Behavior normal.         Thought Content: Thought content normal.         Cognition and Memory: Memory is not impaired. She does not exhibit impaired recent memory or impaired remote memory.         Judgment: Judgment normal.         Assessment and Plan:  Problem List Items Addressed This Visit          Cardiac and Vasculature    Screening for hyperlipidemia    Relevant Orders    Lipid Panel With / Chol / HDL Ratio       Health Encounters    Encounter for general adult medical examination with abnormal findings - Primary    Relevant Orders    CBC & Differential    Comprehensive Metabolic Panel    TSH     Other Visit  Diagnoses       Need for hepatitis C screening test        Relevant Orders    Hepatitis C Antibody

## 2023-10-30 ENCOUNTER — OFFICE VISIT (OUTPATIENT)
Dept: FAMILY MEDICINE CLINIC | Facility: CLINIC | Age: 33
End: 2023-10-30
Payer: OTHER GOVERNMENT

## 2023-10-30 VITALS
HEART RATE: 111 BPM | DIASTOLIC BLOOD PRESSURE: 70 MMHG | HEIGHT: 64 IN | BODY MASS INDEX: 24.62 KG/M2 | OXYGEN SATURATION: 97 % | SYSTOLIC BLOOD PRESSURE: 114 MMHG | RESPIRATION RATE: 18 BRPM | WEIGHT: 144.2 LBS | TEMPERATURE: 97.3 F

## 2023-10-30 DIAGNOSIS — Z00.01 ENCOUNTER FOR GENERAL ADULT MEDICAL EXAMINATION WITH ABNORMAL FINDINGS: Primary | ICD-10-CM

## 2023-10-30 DIAGNOSIS — Z13.220 SCREENING FOR HYPERLIPIDEMIA: ICD-10-CM

## 2023-10-30 DIAGNOSIS — Z11.59 NEED FOR HEPATITIS C SCREENING TEST: ICD-10-CM

## 2023-10-30 PROCEDURE — 99395 PREV VISIT EST AGE 18-39: CPT | Performed by: FAMILY MEDICINE

## 2023-11-10 ENCOUNTER — TELEPHONE (OUTPATIENT)
Dept: FAMILY MEDICINE CLINIC | Facility: CLINIC | Age: 33
End: 2023-11-10

## 2023-11-10 NOTE — TELEPHONE ENCOUNTER
Caller: Evangelist Mcdonald    Relationship: Self    Best call back number: [phone]    What form or medical record are you requesting: COPY OF IMMUNIZATION RECORD WITH OFFICE LETTERHEAD     Who is requesting this form or medical record from you: PATIENT     How would you like to receive the form or medical records (pick-up, mail, fax): E-MAIL    E-MAIL: [email or web address]    Timeframe paperwork needed: ASAP    Additional notes: PATIENT CALLED AND REQUESTED A COPY OF HER IMMUNIZATION RECORDS TO BE EMAILED TO HER, SHE SAID THAT THE COPY SHE HAS RIGHT NOW CAN'T BE USED BECAUSE IT IS NOT ON OFFICE LETTERHEAD

## 2023-11-16 ENCOUNTER — TELEPHONE (OUTPATIENT)
Dept: FAMILY MEDICINE CLINIC | Facility: CLINIC | Age: 33
End: 2023-11-16

## 2023-11-16 NOTE — TELEPHONE ENCOUNTER
Caller: Evangelist Mcdonald    Relationship to patient: Self    Best call back number: [phone]    Patient is needing:     PATIENT IS ASKING IF THE OFFICE CAN PUT THE DATE OF WHEN SHE HAD THE CHICKEN POX ON HER CHART.     DATE: 07.01.1994    SHE WOULD LIKE TO HAVE THIS FIXED AND HAVE HER COMPLETE SHOT RECORD EMAILED TO HER IF POSSIBLE.    EMAIL: [email or web address]     PLEASE CALL TO CONFIRM OR TO DISCUSS.

## 2023-11-22 LAB
ALBUMIN SERPL-MCNC: 4.6 G/DL (ref 3.9–4.9)
ALBUMIN/GLOB SERPL: 1.9 {RATIO} (ref 1.2–2.2)
ALP SERPL-CCNC: 158 IU/L (ref 44–121)
ALT SERPL-CCNC: 12 IU/L (ref 0–32)
AST SERPL-CCNC: 13 IU/L (ref 0–40)
BASOPHILS # BLD AUTO: 0 X10E3/UL (ref 0–0.2)
BASOPHILS NFR BLD AUTO: 1 %
BILIRUB SERPL-MCNC: 0.7 MG/DL (ref 0–1.2)
BUN SERPL-MCNC: 15 MG/DL (ref 6–20)
BUN/CREAT SERPL: 21 (ref 9–23)
CALCIUM SERPL-MCNC: 9.6 MG/DL (ref 8.7–10.2)
CHLORIDE SERPL-SCNC: 103 MMOL/L (ref 96–106)
CHOLEST SERPL-MCNC: 189 MG/DL (ref 100–199)
CHOLEST/HDLC SERPL: 2.6 RATIO (ref 0–4.4)
CO2 SERPL-SCNC: 23 MMOL/L (ref 20–29)
CREAT SERPL-MCNC: 0.7 MG/DL (ref 0.57–1)
EGFRCR SERPLBLD CKD-EPI 2021: 116 ML/MIN/1.73
EOSINOPHIL # BLD AUTO: 0.3 X10E3/UL (ref 0–0.4)
EOSINOPHIL NFR BLD AUTO: 5 %
ERYTHROCYTE [DISTWIDTH] IN BLOOD BY AUTOMATED COUNT: 13 % (ref 11.7–15.4)
GLOBULIN SER CALC-MCNC: 2.4 G/DL (ref 1.5–4.5)
GLUCOSE SERPL-MCNC: 92 MG/DL (ref 70–99)
HCT VFR BLD AUTO: 41.1 % (ref 34–46.6)
HCV IGG SERPL QL IA: NON REACTIVE
HDLC SERPL-MCNC: 73 MG/DL
HGB BLD-MCNC: 13.5 G/DL (ref 11.1–15.9)
IMM GRANULOCYTES # BLD AUTO: 0 X10E3/UL (ref 0–0.1)
IMM GRANULOCYTES NFR BLD AUTO: 0 %
LDLC SERPL CALC-MCNC: 106 MG/DL (ref 0–99)
LYMPHOCYTES # BLD AUTO: 1.6 X10E3/UL (ref 0.7–3.1)
LYMPHOCYTES NFR BLD AUTO: 28 %
MCH RBC QN AUTO: 30.3 PG (ref 26.6–33)
MCHC RBC AUTO-ENTMCNC: 32.8 G/DL (ref 31.5–35.7)
MCV RBC AUTO: 92 FL (ref 79–97)
MONOCYTES # BLD AUTO: 0.4 X10E3/UL (ref 0.1–0.9)
MONOCYTES NFR BLD AUTO: 6 %
NEUTROPHILS # BLD AUTO: 3.4 X10E3/UL (ref 1.4–7)
NEUTROPHILS NFR BLD AUTO: 60 %
PLATELET # BLD AUTO: 222 X10E3/UL (ref 150–450)
POTASSIUM SERPL-SCNC: 4.1 MMOL/L (ref 3.5–5.2)
PROT SERPL-MCNC: 7 G/DL (ref 6–8.5)
RBC # BLD AUTO: 4.46 X10E6/UL (ref 3.77–5.28)
SODIUM SERPL-SCNC: 138 MMOL/L (ref 134–144)
TRIGL SERPL-MCNC: 50 MG/DL (ref 0–149)
TSH SERPL DL<=0.005 MIU/L-ACNC: 1.34 UIU/ML (ref 0.45–4.5)
VLDLC SERPL CALC-MCNC: 10 MG/DL (ref 5–40)
WBC # BLD AUTO: 5.8 X10E3/UL (ref 3.4–10.8)

## 2023-11-30 ENCOUNTER — OFFICE VISIT (OUTPATIENT)
Dept: FAMILY MEDICINE CLINIC | Facility: CLINIC | Age: 33
End: 2023-11-30
Payer: OTHER GOVERNMENT

## 2023-11-30 VITALS
HEART RATE: 111 BPM | SYSTOLIC BLOOD PRESSURE: 102 MMHG | WEIGHT: 143.2 LBS | OXYGEN SATURATION: 99 % | RESPIRATION RATE: 18 BRPM | HEIGHT: 64 IN | BODY MASS INDEX: 24.45 KG/M2 | TEMPERATURE: 97.7 F | DIASTOLIC BLOOD PRESSURE: 62 MMHG

## 2023-11-30 DIAGNOSIS — F41.1 GENERALIZED ANXIETY DISORDER: Primary | ICD-10-CM

## 2023-11-30 DIAGNOSIS — J06.9 UPPER RESPIRATORY TRACT INFECTION, UNSPECIFIED TYPE: ICD-10-CM

## 2023-11-30 DIAGNOSIS — J01.00 ACUTE NON-RECURRENT MAXILLARY SINUSITIS: ICD-10-CM

## 2023-11-30 PROBLEM — F43.0 ACUTE REACTION TO STRESS: Status: RESOLVED | Noted: 2019-09-27 | Resolved: 2023-11-30

## 2023-11-30 LAB
EXPIRATION DATE: NORMAL
FLUAV AG UPPER RESP QL IA.RAPID: NOT DETECTED
FLUBV AG UPPER RESP QL IA.RAPID: NOT DETECTED
INTERNAL CONTROL: NORMAL
Lab: NORMAL
SARS-COV-2 AG UPPER RESP QL IA.RAPID: NOT DETECTED

## 2023-11-30 PROCEDURE — 99214 OFFICE O/P EST MOD 30 MIN: CPT | Performed by: FAMILY MEDICINE

## 2023-11-30 PROCEDURE — 87428 SARSCOV & INF VIR A&B AG IA: CPT | Performed by: FAMILY MEDICINE

## 2023-11-30 RX ORDER — AZITHROMYCIN 250 MG/1
TABLET, FILM COATED ORAL
Qty: 6 TABLET | Refills: 0 | Status: SHIPPED | OUTPATIENT
Start: 2023-11-30

## 2023-11-30 RX ORDER — SERTRALINE HYDROCHLORIDE 25 MG/1
25 TABLET, FILM COATED ORAL DAILY
Qty: 30 TABLET | Refills: 1 | Status: SHIPPED | OUTPATIENT
Start: 2023-11-30

## 2023-11-30 NOTE — PROGRESS NOTES
Subjective   Evangelist Mcdonald is a 33 y.o. female. Presents to [facility]    Chief Complaint   Patient presents with    Anxiety    URI       Anxiety  Presents for follow-up visit. Symptoms include decreased concentration, depressed mood, excessive worry, insomnia, nervous/anxious behavior, panic and restlessness. Patient reports no chest pain, irritability, nausea or palpitations. Symptoms occur most days. The severity of symptoms is moderate. The patient sleeps 6 hours per night. The quality of sleep is fair. Nighttime awakenings: one to two.       URI   This is a new problem. The current episode started in the past 7 days (11/27/2023). The problem has been unchanged. There has been no fever. Associated symptoms include congestion, coughing, ear pain, rhinorrhea, sinus pain, sneezing and a sore throat. Pertinent negatives include no chest pain, headaches, nausea, swollen glands, vomiting or wheezing. She has tried nothing for the symptoms.      Covid negative in office.     No s/s of postpartum depression. She feels guilty when she needs to study for school.     I personally reviewed and updated the patient's allergies, medications, problem list, and past medical, surgical, social, and family history. I have reviewed and confirmed the accuracy of the History of Present Illness and Review of Symptoms as documented by the MA/LPN/RN. Tanja Caro MD    Allergies:  Allergies   Allergen Reactions    Ceftriaxone Hives, Itching, Rash and Swelling    Sulfa Antibiotics Hives    Ciprofloxacin Hives       Social History:  Social History     Socioeconomic History    Marital status: Single   Tobacco Use    Smoking status: Never    Smokeless tobacco: Never   Substance and Sexual Activity    Alcohol use: No    Drug use: No       Family History:  Family History   Problem Relation Age of Onset    Diabetes Maternal Grandfather         mellitus    Heart disease Maternal Grandfather         ischemic    Cancer  "Paternal Grandfather         ?    Heart disease Paternal Grandfather         ischemic    Bipolar disorder Cousin        Past Medical History :  Patient Active Problem List   Diagnosis    Generalized anxiety disorder    Elevated C-reactive protein    Fibrocystic breast disease    History of varicella    Other bladder disorder    Other specific developmental learning difficulties    Seasonal allergic rhinitis due to pollen    Vesicoureteral reflux    Miliaria rubra    Borderline open-angle glaucoma    Itchy scalp    Pruritus    GERD (gastroesophageal reflux disease)    Alternating constipation and diarrhea    Herpes simplex of female genitalia    Encounter for general adult medical examination with abnormal findings    Screening for hyperlipidemia    Cough with exposure to COVID-19 virus    Disturbed concentration    Right hip pain    Vestibulitis, vulvar    Type 3a perineal laceration    POTS (postural orthostatic tachycardia syndrome)    Frequency of micturition    Chronic interstitial cystitis    Need for Tdap vaccination    Visit for TB skin test    Acute non-recurrent maxillary sinusitis       Medication List:    Current Outpatient Medications:     Prenatal Vit-DSS-Fe Cbn-FA (PRENATAL AD PO), Take  by mouth., Disp: , Rfl:     valACYclovir (VALTREX) 500 MG tablet, Take 1 tablet by mouth Daily., Disp: 30 tablet, Rfl: 12    azithromycin (ZITHROMAX) 250 MG tablet, Take 2 tablets the first day, then 1 tablet daily for 4 days., Disp: 6 tablet, Rfl: 0    sertraline (ZOLOFT) 25 MG tablet, Take 1 tablet by mouth Daily., Disp: 30 tablet, Rfl: 1    Past Surgical History:  No past surgical history on file.      Physical Exam:      Vital Signs:    Vitals:    11/30/23 0835   BP: 102/62   Pulse: 111   Resp: 18   Temp: 97.7 °F (36.5 °C)   SpO2: 99%        /62 (BP Location: Left arm, Patient Position: Sitting, Cuff Size: Adult)   Pulse 111   Temp 97.7 °F (36.5 °C) (Temporal)   Resp 18   Ht 162.6 cm (64\")   Wt 65 kg " (143 lb 3.2 oz)   SpO2 99% Comment: room air  Breastfeeding Yes   BMI 24.58 kg/m²     Wt Readings from Last 3 Encounters:   11/30/23 65 kg (143 lb 3.2 oz)   10/30/23 65.4 kg (144 lb 3.2 oz)   09/22/23 63 kg (139 lb)       Result Review :   The following data was reviewed by: Tanja Caro MD on 11/30/2023:              Latest Reference Range & Units 11/30/23 08:51   SARS Antigen Not Detected, Presumptive Negative  Not Detected   Expiration Date  11/03/24   Lot Number  [id number]   Influenza A Antigen EDUARDO Not Detected  Not Detected   Influenza B Antigen EDUARDO Not Detected  Not Detected       Physical Exam  Vitals reviewed.   Constitutional:       Appearance: Normal appearance. She is well-developed.   HENT:      Head: Normocephalic and atraumatic.      Right Ear: Tympanic membrane and external ear normal. No decreased hearing noted. No tenderness. No middle ear effusion. Tympanic membrane is not injected, erythematous, retracted or bulging.      Left Ear: Tympanic membrane and external ear normal. No decreased hearing noted. No tenderness.  No middle ear effusion. Tympanic membrane is not injected, erythematous, retracted or bulging.      Nose: Nose normal. No rhinorrhea.      Mouth/Throat:      Mouth: Mucous membranes are moist.      Pharynx: No oropharyngeal exudate or posterior oropharyngeal erythema.   Eyes:      General:         Right eye: No discharge.         Left eye: No discharge.   Cardiovascular:      Rate and Rhythm: Normal rate and regular rhythm.      Heart sounds: Normal heart sounds. No murmur heard.     No friction rub. No gallop.   Pulmonary:      Effort: Pulmonary effort is normal. No respiratory distress.      Breath sounds: Normal breath sounds. No wheezing or rales.   Lymphadenopathy:      Cervical: No cervical adenopathy.   Skin:     General: Skin is warm and dry.      Findings: No rash.   Neurological:      Mental Status: She is alert and oriented to person, place, and time.       Coordination: Coordination normal.      Gait: Gait normal.   Psychiatric:         Behavior: Behavior is cooperative.         Assessment and Plan:  Problems Addressed this Visit          ENT    Acute non-recurrent maxillary sinusitis     increase fluids, tylenol for fever, motrin for pain. Humidifier to help with congestion and to sleep at night. Dicussed OTC meds, gargle with warm salt water. If there is recurrent fever, shortness of breath, lethargy, advised to come in to the office or go to the ER.           Relevant Medications    azithromycin (ZITHROMAX) 250 MG tablet       Mental Health    Generalized anxiety disorder - Primary     Psychological condition is worsening.  Medication changes per orders.  Psychological condition  will be reassessed in 4 weeks.    Good social support, no suicidal or homicidal ideation. Diagnosis and treatment discussed. Discussed counseling. Discussed medication, dosing and adverse effects. Return in 4 weeks           Relevant Medications    sertraline (ZOLOFT) 25 MG tablet     Other Visit Diagnoses       Upper respiratory tract infection, unspecified type        Relevant Medications    azithromycin (ZITHROMAX) 250 MG tablet    Other Relevant Orders    POCT SARS-CoV-2 + Flu Antigen EDUARDO (Completed)          Diagnoses         Codes Comments    Generalized anxiety disorder    -  Primary ICD-10-CM: F41.1  ICD-9-CM: 300.02     Upper respiratory tract infection, unspecified type     ICD-10-CM: J06.9  ICD-9-CM: 465.9     Acute non-recurrent maxillary sinusitis     ICD-10-CM: J01.00  ICD-9-CM: 461.0              BMI is within normal parameters. No other follow-up for BMI required.      An After Visit Summary and PPPS were given to the patient.

## 2023-12-06 ENCOUNTER — TELEPHONE (OUTPATIENT)
Dept: FAMILY MEDICINE CLINIC | Facility: CLINIC | Age: 33
End: 2023-12-06
Payer: OTHER GOVERNMENT

## 2023-12-10 PROBLEM — F41.1 GENERALIZED ANXIETY DISORDER: Status: ACTIVE | Noted: 2019-09-27

## 2023-12-10 NOTE — ASSESSMENT & PLAN NOTE
Psychological condition is worsening.  Medication changes per orders.  Psychological condition  will be reassessed in 4 weeks.    Good social support, no suicidal or homicidal ideation. Diagnosis and treatment discussed. Discussed counseling. Discussed medication, dosing and adverse effects. Return in 4 weeks

## 2024-02-09 ENCOUNTER — OFFICE VISIT (OUTPATIENT)
Dept: FAMILY MEDICINE CLINIC | Facility: CLINIC | Age: 34
End: 2024-02-09
Payer: OTHER GOVERNMENT

## 2024-02-09 VITALS
HEART RATE: 112 BPM | HEIGHT: 64 IN | WEIGHT: 149 LBS | TEMPERATURE: 97.7 F | DIASTOLIC BLOOD PRESSURE: 72 MMHG | SYSTOLIC BLOOD PRESSURE: 108 MMHG | RESPIRATION RATE: 16 BRPM | BODY MASS INDEX: 25.44 KG/M2 | OXYGEN SATURATION: 98 %

## 2024-02-09 DIAGNOSIS — J06.9 UPPER RESPIRATORY TRACT INFECTION, UNSPECIFIED TYPE: Primary | ICD-10-CM

## 2024-02-09 DIAGNOSIS — J30.1 SEASONAL ALLERGIC RHINITIS DUE TO POLLEN: ICD-10-CM

## 2024-02-09 DIAGNOSIS — E66.3 OVERWEIGHT (BMI 25.0-29.9): ICD-10-CM

## 2024-02-09 PROBLEM — R05.8 COUGH WITH EXPOSURE TO COVID-19 VIRUS: Status: RESOLVED | Noted: 2021-08-12 | Resolved: 2024-02-09

## 2024-02-09 PROBLEM — N94.810 VESTIBULITIS, VULVAR: Status: RESOLVED | Noted: 2018-11-20 | Resolved: 2024-02-09

## 2024-02-09 PROBLEM — M25.551 RIGHT HIP PAIN: Status: RESOLVED | Noted: 2021-10-25 | Resolved: 2024-02-09

## 2024-02-09 PROBLEM — R35.0 FREQUENCY OF MICTURITION: Status: RESOLVED | Noted: 2023-01-05 | Resolved: 2024-02-09

## 2024-02-09 PROBLEM — L29.9 ITCHY SCALP: Status: RESOLVED | Noted: 2020-09-14 | Resolved: 2024-02-09

## 2024-02-09 PROBLEM — J01.00 ACUTE NON-RECURRENT MAXILLARY SINUSITIS: Status: RESOLVED | Noted: 2023-11-30 | Resolved: 2024-02-09

## 2024-02-09 PROBLEM — R41.840 DISTURBED CONCENTRATION: Status: RESOLVED | Noted: 2021-10-25 | Resolved: 2024-02-09

## 2024-02-09 PROBLEM — Z11.1 VISIT FOR TB SKIN TEST: Status: RESOLVED | Noted: 2023-09-22 | Resolved: 2024-02-09

## 2024-02-09 PROBLEM — Z20.822 COUGH WITH EXPOSURE TO COVID-19 VIRUS: Status: RESOLVED | Noted: 2021-08-12 | Resolved: 2024-02-09

## 2024-02-09 LAB
EXPIRATION DATE: NORMAL
EXPIRATION DATE: NORMAL
FLUAV AG UPPER RESP QL IA.RAPID: NOT DETECTED
FLUBV AG UPPER RESP QL IA.RAPID: NOT DETECTED
INTERNAL CONTROL: NORMAL
INTERNAL CONTROL: NORMAL
Lab: NORMAL
Lab: NORMAL
S PYO AG THROAT QL: NEGATIVE
SARS-COV-2 AG UPPER RESP QL IA.RAPID: NOT DETECTED

## 2024-02-09 RX ORDER — CETIRIZINE HYDROCHLORIDE 10 MG/1
10 TABLET ORAL DAILY
Qty: 30 TABLET | Refills: 12 | Status: SHIPPED | OUTPATIENT
Start: 2024-02-09

## 2024-02-09 RX ORDER — MONTELUKAST SODIUM 10 MG/1
10 TABLET ORAL NIGHTLY
Qty: 30 TABLET | Refills: 12 | Status: SHIPPED | OUTPATIENT
Start: 2024-02-09

## 2024-02-09 NOTE — PROGRESS NOTES
"Chief Complaint  URI    Subjective     CC  Problem List  Visit Diagnosis   Encounters  Notes  Medications  Labs  Result Review Imaging  Media    Evangelist Mcdonald presents to [facility] for   URI   This is a new problem. The current episode started 1 to 4 weeks ago. The problem has been gradually worsening. There has been no fever. Associated symptoms include congestion, coughing, ear pain, a plugged ear sensation, rhinorrhea and a sore throat. Pertinent negatives include no abdominal pain, chest pain, dysuria, headaches, sinus pain or wheezing. She has tried nothing for the symptoms. The treatment provided no relief.       Review of Systems   Constitutional:  Negative for appetite change and fever.   HENT:  Positive for congestion, ear pain, rhinorrhea and sore throat.    Respiratory:  Positive for cough. Negative for wheezing.    Cardiovascular:  Negative for chest pain.   Gastrointestinal:  Negative for abdominal pain.   Endocrine: Negative for cold intolerance, heat intolerance, polydipsia and polyuria.   Genitourinary:  Negative for dysuria.   Musculoskeletal:  Negative for back pain.   Hematological:  Negative for adenopathy. Does not bruise/bleed easily.        Objective   Vital Signs:   /72 (BP Location: Left arm, Patient Position: Sitting, Cuff Size: Adult)   Pulse 112   Temp 97.7 °F (36.5 °C) (Infrared)   Resp 16   Ht 162.6 cm (64\")   Wt 67.6 kg (149 lb)   SpO2 98% Comment: room air  BMI 25.58 kg/m²     Physical Exam  Constitutional:       General: She is not in acute distress.  HENT:      Right Ear: Tympanic membrane normal.      Left Ear: Tympanic membrane normal.      Nose: Congestion present.      Mouth/Throat:      Pharynx: No oropharyngeal exudate or posterior oropharyngeal erythema (moderate post nasal secretions.).   Cardiovascular:      Rate and Rhythm: Normal rate and regular rhythm.      Heart sounds: No murmur heard.  Pulmonary:      Effort: " Pulmonary effort is normal.   Musculoskeletal:      Cervical back: Neck supple.   Lymphadenopathy:      Cervical: No cervical adenopathy.   Skin:     Findings: No rash.   Neurological:      Mental Status: She is alert.        Result Review :Labs  Result Review  Imaging  Med Tab  Media                 Assessment and Plan CC Problem List  Visit Diagnosis  ROS  Review (Popup)  Health Maintenance  Quality  BestPractice  Medications  SmartSets  SnapShot Encounters  Media  Problem List Items Addressed This Visit          Unprioritized    Seasonal allergic rhinitis due to pollen    Overview     contributing to symptoms         Relevant Medications    montelukast (SINGULAIR) 10 MG tablet    cetirizine (zyrTEC) 10 MG tablet    Overweight (BMI 25.0-29.9)    Current Assessment & Plan     Patient's (Body mass index is 25.58 kg/m².) indicates that they are overweight with health conditions that include none . Weight is unchanged. BMI is is above average; BMI management plan is completed. We discussed low calorie, low carb based diet program, portion control, and increasing exercise.           Other Visit Diagnoses       Upper respiratory tract infection, unspecified type    -  Primary    reassuring exam, neg covid, neg flu, Fluids saline flushes, humidity.    Relevant Orders    POCT rapid strep A (Completed)    POCT SARS-CoV-2 + Flu Antigen EDUARDO (Completed)            Follow Up Instructions Charge Capture  Follow-up Communications  Return if symptoms worsen or fail to improve.  Patient was given instructions and counseling regarding her condition or for health maintenance advice. Please see specific information pulled into the AVS if appropriate.

## 2024-02-25 PROBLEM — E66.3 OVERWEIGHT (BMI 25.0-29.9): Status: ACTIVE | Noted: 2024-02-25

## 2024-02-25 NOTE — ASSESSMENT & PLAN NOTE
Patient's (Body mass index is 25.58 kg/m².) indicates that they are overweight with health conditions that include none . Weight is unchanged. BMI is is above average; BMI management plan is completed. We discussed low calorie, low carb based diet program, portion control, and increasing exercise.

## 2024-05-17 ENCOUNTER — TELEPHONE (OUTPATIENT)
Dept: FAMILY MEDICINE CLINIC | Facility: CLINIC | Age: 34
End: 2024-05-17
Payer: OTHER GOVERNMENT

## 2024-05-17 NOTE — TELEPHONE ENCOUNTER
Caller: Evangelist Mcdonald     Relationship: SELF    Best call back number: [phone]     What is your medical concern?     PATIENT STATED THAT SHE HAD BLOOD WORK DONE AT [facility] AND WOULD LIKE FOR THE OFFICE TO REACH OUT AND GET A COPY OF THOSE TEST RESULTS SO THAT DR NUNEZ WILL HAVE THEM ON FILE.      PLEASE CALL PATIENT AND ADVISE.

## 2024-06-17 NOTE — PROGRESS NOTES
Subjective   Evangelist Mcdonald is a 34 y.o. female. Presents to [facility]    Chief Complaint   Patient presents with    Abdominal Pain       History of Present Illness  Patient is wanting to discuss vitamin d injections .   Abdominal Pain  This is a new problem. The current episode started more than 1 month ago. The onset quality is gradual. The problem occurs intermittently. The problem has been worse. The pain is located in the RUQ. The pain is mild. The quality of the pain is sharp and tearing. The abdominal pain does not radiate. Pertinent negatives include no constipation, diarrhea, dysuria, frequency, headaches, hematuria, nausea or vomiting. Exacerbated by: certain food. The pain is relieved by Nothing. She has tried nothing for the symptoms.    When she eats greasy or fatty foods she gets pain in RUQ    I personally reviewed and updated the patient's allergies, medications, problem list, and past medical, surgical, social, and family history. I have reviewed and confirmed the accuracy of the History of Present Illness and Review of Symptoms as documented by the MA/LPN/RN. Tanja Caro MD    Allergies:  Allergies   Allergen Reactions    Ceftriaxone Hives, Itching, Rash and Swelling    Sulfa Antibiotics Hives    Ciprofloxacin Hives       Social History:  Social History     Socioeconomic History    Marital status: Single   Tobacco Use    Smoking status: Never    Smokeless tobacco: Never   Substance and Sexual Activity    Alcohol use: No    Drug use: No       Family History:  Family History   Problem Relation Age of Onset    Diabetes Maternal Grandfather         mellitus    Heart disease Maternal Grandfather         ischemic    Cancer Paternal Grandfather         ?    Heart disease Paternal Grandfather         ischemic    Bipolar disorder Cousin        Past Medical History :  Patient Active Problem List   Diagnosis    Generalized anxiety disorder    Elevated C-reactive protein    Fibrocystic  "breast disease    History of varicella    Other bladder disorder    Other specific developmental learning difficulties    Seasonal allergic rhinitis due to pollen    Vesicoureteral reflux    Miliaria rubra    Borderline open-angle glaucoma    Pruritus    GERD (gastroesophageal reflux disease)    Alternating constipation and diarrhea    Herpes simplex of female genitalia    Encounter for general adult medical examination with abnormal findings    Screening for hyperlipidemia    POTS (postural orthostatic tachycardia syndrome)    Chronic interstitial cystitis    Need for Tdap vaccination    Overweight (BMI 25.0-29.9)    Vitamin D deficiency       Medication List:    Current Outpatient Medications:     Prenatal Vit-DSS-Fe Cbn-FA (PRENATAL AD PO), Take  by mouth., Disp: , Rfl:     valACYclovir (VALTREX) 500 MG tablet, Take 1 tablet by mouth Daily., Disp: 30 tablet, Rfl: 12    Cholecalciferol (Vitamin D) 50 MCG (2000 UT) tablet, Take 1 tablet by mouth Daily., Disp: 30 tablet, Rfl: 11    Past Surgical History:  No past surgical history on file.      Physical Exam:      Vital Signs:    Vitals:    06/20/24 0943   BP: 102/74   Pulse: 67   Resp: 17   Temp: 97.6 °F (36.4 °C)   SpO2: 99%        /74 (BP Location: Right arm, Patient Position: Sitting, Cuff Size: Adult)   Pulse 67   Temp 97.6 °F (36.4 °C) (Temporal)   Resp 17   Ht 162.6 cm (64\")   Wt 72.2 kg (159 lb 3.2 oz)   SpO2 99% Comment: room air  BMI 27.33 kg/m²     Wt Readings from Last 3 Encounters:   06/20/24 72.2 kg (159 lb 3.2 oz)   02/09/24 67.6 kg (149 lb)   11/30/23 65 kg (143 lb 3.2 oz)       Result Review :                Physical Exam  Vitals reviewed.   Constitutional:       Appearance: Normal appearance. She is well-developed.   HENT:      Head: Normocephalic and atraumatic.   Eyes:      General:         Right eye: No discharge.         Left eye: No discharge.   Cardiovascular:      Rate and Rhythm: Normal rate and regular rhythm.      Heart " sounds: Normal heart sounds. No murmur heard.     No friction rub. No gallop.   Pulmonary:      Effort: Pulmonary effort is normal. No respiratory distress.      Breath sounds: Normal breath sounds. No wheezing or rales.   Abdominal:      General: Abdomen is flat. There is no distension.      Palpations: Abdomen is soft. There is no mass.      Tenderness: There is no abdominal tenderness. There is no guarding or rebound.      Hernia: No hernia is present.   Skin:     General: Skin is warm and dry.      Findings: No rash.   Neurological:      Mental Status: She is alert and oriented to person, place, and time.      Coordination: Coordination normal.      Gait: Gait normal.   Psychiatric:         Behavior: Behavior is cooperative.         Assessment and Plan:  Problems Addressed this Visit          Endocrine and Metabolic    Overweight (BMI 25.0-29.9)    Vitamin D deficiency    Relevant Medications    Cholecalciferol (Vitamin D) 50 MCG (2000 UT) tablet     Other Visit Diagnoses       Right upper quadrant abdominal pain    -  Primary    Will get labs and u/s    Relevant Orders    CBC & Differential (Completed)    Comprehensive Metabolic Panel (Completed)    Amylase (Completed)    Lipase (Completed)    US Abdomen Limited          Diagnoses         Codes Comments    Right upper quadrant abdominal pain    -  Primary ICD-10-CM: R10.11  ICD-9-CM: 789.01 Will get labs and u/s    Overweight (BMI 25.0-29.9)     ICD-10-CM: E66.3  ICD-9-CM: 278.02     Vitamin D deficiency     ICD-10-CM: E55.9  ICD-9-CM: 268.9                          An After Visit Summary and PPPS were given to the patient.

## 2024-06-20 ENCOUNTER — OFFICE VISIT (OUTPATIENT)
Dept: FAMILY MEDICINE CLINIC | Facility: CLINIC | Age: 34
End: 2024-06-20
Payer: OTHER GOVERNMENT

## 2024-06-20 VITALS
HEART RATE: 67 BPM | RESPIRATION RATE: 17 BRPM | OXYGEN SATURATION: 99 % | HEIGHT: 64 IN | DIASTOLIC BLOOD PRESSURE: 74 MMHG | WEIGHT: 159.2 LBS | SYSTOLIC BLOOD PRESSURE: 102 MMHG | TEMPERATURE: 97.6 F | BODY MASS INDEX: 27.18 KG/M2

## 2024-06-20 DIAGNOSIS — E66.3 OVERWEIGHT (BMI 25.0-29.9): ICD-10-CM

## 2024-06-20 DIAGNOSIS — E55.9 VITAMIN D DEFICIENCY: ICD-10-CM

## 2024-06-20 DIAGNOSIS — R10.11 RIGHT UPPER QUADRANT ABDOMINAL PAIN: Primary | ICD-10-CM

## 2024-06-20 PROCEDURE — 99214 OFFICE O/P EST MOD 30 MIN: CPT | Performed by: FAMILY MEDICINE

## 2024-06-20 RX ORDER — CHOLECALCIFEROL (VITAMIN D3) 50 MCG
2000 TABLET ORAL DAILY
Qty: 30 TABLET | Refills: 11 | Status: SHIPPED | OUTPATIENT
Start: 2024-06-20 | End: 2025-06-20

## 2024-06-21 LAB
ALBUMIN SERPL-MCNC: 4.6 G/DL (ref 3.9–4.9)
ALP SERPL-CCNC: 138 IU/L (ref 44–121)
ALT SERPL-CCNC: 13 IU/L (ref 0–32)
AMYLASE SERPL-CCNC: 72 U/L (ref 31–110)
AST SERPL-CCNC: 11 IU/L (ref 0–40)
BASOPHILS # BLD AUTO: 0 X10E3/UL (ref 0–0.2)
BASOPHILS NFR BLD AUTO: 0 %
BILIRUB SERPL-MCNC: 0.4 MG/DL (ref 0–1.2)
BUN SERPL-MCNC: 11 MG/DL (ref 6–20)
BUN/CREAT SERPL: 13 (ref 9–23)
CALCIUM SERPL-MCNC: 10.5 MG/DL (ref 8.7–10.2)
CHLORIDE SERPL-SCNC: 102 MMOL/L (ref 96–106)
CO2 SERPL-SCNC: 26 MMOL/L (ref 20–29)
CREAT SERPL-MCNC: 0.82 MG/DL (ref 0.57–1)
EGFRCR SERPLBLD CKD-EPI 2021: 96 ML/MIN/1.73
EOSINOPHIL # BLD AUTO: 0.3 X10E3/UL (ref 0–0.4)
EOSINOPHIL NFR BLD AUTO: 4 %
ERYTHROCYTE [DISTWIDTH] IN BLOOD BY AUTOMATED COUNT: 12.6 % (ref 11.7–15.4)
GLOBULIN SER CALC-MCNC: 2.5 G/DL (ref 1.5–4.5)
GLUCOSE SERPL-MCNC: 85 MG/DL (ref 70–99)
HCT VFR BLD AUTO: 40.2 % (ref 34–46.6)
HGB BLD-MCNC: 13.2 G/DL (ref 11.1–15.9)
IMM GRANULOCYTES # BLD AUTO: 0 X10E3/UL (ref 0–0.1)
IMM GRANULOCYTES NFR BLD AUTO: 0 %
LIPASE SERPL-CCNC: 34 U/L (ref 14–72)
LYMPHOCYTES # BLD AUTO: 2 X10E3/UL (ref 0.7–3.1)
LYMPHOCYTES NFR BLD AUTO: 29 %
MCH RBC QN AUTO: 29.5 PG (ref 26.6–33)
MCHC RBC AUTO-ENTMCNC: 32.8 G/DL (ref 31.5–35.7)
MCV RBC AUTO: 90 FL (ref 79–97)
MONOCYTES # BLD AUTO: 0.5 X10E3/UL (ref 0.1–0.9)
MONOCYTES NFR BLD AUTO: 7 %
NEUTROPHILS # BLD AUTO: 4.2 X10E3/UL (ref 1.4–7)
NEUTROPHILS NFR BLD AUTO: 60 %
PLATELET # BLD AUTO: 250 X10E3/UL (ref 150–450)
POTASSIUM SERPL-SCNC: 4.5 MMOL/L (ref 3.5–5.2)
PROT SERPL-MCNC: 7.1 G/DL (ref 6–8.5)
RBC # BLD AUTO: 4.48 X10E6/UL (ref 3.77–5.28)
SODIUM SERPL-SCNC: 139 MMOL/L (ref 134–144)
WBC # BLD AUTO: 7 X10E3/UL (ref 3.4–10.8)

## 2024-07-01 ENCOUNTER — HOSPITAL ENCOUNTER (OUTPATIENT)
Dept: ULTRASOUND IMAGING | Facility: HOSPITAL | Age: 34
Discharge: HOME | End: 2024-07-01
Admitting: FAMILY MEDICINE
Payer: OTHER GOVERNMENT

## 2024-07-01 DIAGNOSIS — R10.11 RIGHT UPPER QUADRANT ABDOMINAL PAIN: ICD-10-CM

## 2024-07-01 PROCEDURE — 76705 ECHO EXAM OF ABDOMEN: CPT

## 2024-07-09 DIAGNOSIS — K80.20 CALCULUS OF GALLBLADDER WITHOUT CHOLECYSTITIS WITHOUT OBSTRUCTION: Primary | ICD-10-CM

## 2024-07-09 DIAGNOSIS — R10.11 RIGHT UPPER QUADRANT ABDOMINAL PAIN: ICD-10-CM

## 2024-08-06 ENCOUNTER — OFFICE VISIT (OUTPATIENT)
Dept: SURGERY | Facility: CLINIC | Age: 34
End: 2024-08-06
Payer: OTHER GOVERNMENT

## 2024-08-06 VITALS
WEIGHT: 158.5 LBS | BODY MASS INDEX: 27.06 KG/M2 | TEMPERATURE: 98.2 F | OXYGEN SATURATION: 99 % | HEART RATE: 78 BPM | DIASTOLIC BLOOD PRESSURE: 68 MMHG | SYSTOLIC BLOOD PRESSURE: 107 MMHG | HEIGHT: 64 IN

## 2024-08-06 DIAGNOSIS — K80.20 SYMPTOMATIC CHOLELITHIASIS: Primary | ICD-10-CM

## 2024-08-06 PROCEDURE — 99204 OFFICE O/P NEW MOD 45 MIN: CPT | Performed by: SURGERY

## 2024-08-06 RX ORDER — SODIUM CHLORIDE 9 MG/ML
40 INJECTION, SOLUTION INTRAVENOUS AS NEEDED
OUTPATIENT
Start: 2024-08-06

## 2024-08-06 RX ORDER — INDOCYANINE GREEN AND WATER 25 MG
5 KIT INJECTION ONCE
OUTPATIENT
Start: 2024-08-06 | End: 2024-08-06

## 2024-08-06 RX ORDER — SODIUM CHLORIDE 0.9 % (FLUSH) 0.9 %
3 SYRINGE (ML) INJECTION EVERY 12 HOURS SCHEDULED
OUTPATIENT
Start: 2024-08-06

## 2024-08-06 RX ORDER — SODIUM CHLORIDE 0.9 % (FLUSH) 0.9 %
3-10 SYRINGE (ML) INJECTION AS NEEDED
OUTPATIENT
Start: 2024-08-06

## 2024-08-06 RX ORDER — SODIUM CHLORIDE 9 MG/ML
100 INJECTION, SOLUTION INTRAVENOUS CONTINUOUS
OUTPATIENT
Start: 2024-08-06

## 2024-08-06 NOTE — PROGRESS NOTES
General Surgery History and Physical      Referring Provider: Tanja Caro MD    Chief Complaint:    Cholelithiasis    History of Present Illness:    Evangelist Mcdonald is a 34 y.o. female who presents for evaluation for possible cholecystectomy.  She reports she has been having episodes of right upper quadrant pain for the last year.  She reports she is having more frequent and more severe episodes of pain.  Pain is described as sharp with radiation to the right shoulder.  Pain is worse after p.o. intake.  The pain sometimes wakes her up at night.  There is some associated nausea and also diarrhea.  She reports she has had no relief with taking Tums and denies any reflux type symptoms into the chest.    Past Medical History:   Past Medical History:   Diagnosis Date    Acne varioliformis     Impression: discussed home care and treatment with hibaclens, dial antibacterial, or charcoal based soap. Diagnosis, treatment and course discussed. Discussed medication, dosing and adverse effects. Return if there is worsening or persistance of symptoms    Acute bilateral low back pain without sciatica .    Impression: Alternate heat and ice, NSAIDS, If there is numbness in the groin, trouble urinating, fever with back pain, numbness down legs, trouble walking or fecal incontinence, patient advised to go to the ER.    Acute maxillary sinusitis     Impression: She has been taking Keflex for 5 days and her symptoms are worsening. She was advised to stop Keflex. She was given a Rocephin 1gm IM inj and Depo/Decadron IM inj. She was started on Cipro and Cheratussin to treat her symptoms. Increase fluids. Tylenol/motrin for pain or fever. Medication and medication adverse effects discussed. Follow-up 5-7 days     Acute pain of both knees     Impression: Discussed nsaids and ice. Will start nsaids as bleow, get xrays and place in PT. If no improvement in 3-4 weeks, consider Ortho appt    Acute reaction to stress     Allergic      Allergic reaction to drug     Impression: She is likely having an allergic reaction to Rocephin. This was given this morning along with Depo/Decadron IM inj. She was given Benadryl 100mg in the clinic and advised to take 50mg PO while at home. She was prescribed oral Prednisone to take for the next 4 days.    Annual physical exam     Impression: Discussed injury prevention, diet and exercise, safe sexual practices, and screening for common diseases. Encouraged use of sunscreen and seatbelts. Avoidance of tobacco encouraged. Limitation or avoidance of alcohol encouraged. Recommend yearly dental and eye exams. Also discussed monitoring of blood pressure, lipids.    Anxiety     Impression: She was started on Cymbalta 30mg PO BID on 04/13/16.    Benign familial tremor     Impression: add propanolol for anxiety and tremor. Should also help her dizziness. follow up in 1-2 weeks    Benign skin lesion     Impression: umbilicus dark nevus with 2 colors Consent was obtained and we discussed risks of bleeding and infection with patient's understanding. A 1cm x 0.5cm lesion that has changed in size and color was cleansed. Anesthesia was given using Lidocaine with Epinephrine (1cc). Area sterilized with betadine. An eliptical insicion made, Incision 1cm by 0.7cm.     Bladder trabeculation     Candidal vaginitis     Impression: Prescription(s) as below. Medication(s) usage and side effects discussed. Follow-up as needed.    Central auditory processing disorder     Impression: verified testing and accomdations, IEPs. Note written    Cervical cancer screening     Depression     Difficulty breathing     Impression: Pt returned to the clinic because she developed an allergic reaction to Rocephin. She is currently erythemic and has puritis all over her body. She states she is having difficulty breathing but this may only be due to becoming nervous and anxious. She was given an Albuterol nebulizer treatment. She was prescribed ProAir  in case she has more trouble.    Dizziness     Impression: comes on with anxiety.    Elevated C-reactive protein     Encounter for removal of sutures     Fibrocystic breast disease     Impression: NEgative exam 02/06/2012 Decrease caffine and follow    History of chicken pox     History of varicella     Irregular menstrual cycle     Lentiginous compound nevus of abdominal wall     Malaise and fatigue     Impression: negative exam; check labs    Overweight (BMI 25.0-29.9)     Reactive airway disease without complication     Screening for hyperlipidemia     Screening for thyroid disorder     Seasonal allergic rhinitis due to pollen     Impression: contributing to symptoms    Tobacco use disorder     Vesicoureteral reflux       Past Surgical History:    No past surgical history on file.    Family History:    Family History   Problem Relation Age of Onset    Diabetes Maternal Grandfather         mellitus    Heart disease Maternal Grandfather         ischemic    Cancer Paternal Grandfather         ?    Heart disease Paternal Grandfather         ischemic    Bipolar disorder Cousin      Social History:    Social History     Socioeconomic History    Marital status: Single   Tobacco Use    Smoking status: Never     Passive exposure: Never    Smokeless tobacco: Never   Vaping Use    Vaping status: Never Used   Substance and Sexual Activity    Alcohol use: No    Drug use: No    Sexual activity: Defer     Allergies:   Allergies   Allergen Reactions    Ceftriaxone Hives, Itching, Swelling and Rash     Beta lactam allergy details  Antibiotic reaction: hives, rash, swelling  Age at reaction: unknown  Dose to reaction time: unknown  Reason for antibiotic: unknown  Epinephrine required for reaction?: unknown  Tolerated antibiotics: unknown       Sulfa Antibiotics Hives    Ciprofloxacin Hives     Medications:     Current Outpatient Medications:     Prenatal Vit-DSS-Fe Cbn-FA (PRENATAL AD PO), Take  by mouth., Disp: , Rfl:      valACYclovir (VALTREX) 500 MG tablet, Take 1 tablet by mouth Daily. (Patient taking differently: Take 1 tablet by mouth Daily As Needed.), Disp: 30 tablet, Rfl: 12    Cholecalciferol (Vitamin D) 50 MCG (2000 UT) tablet, Take 1 tablet by mouth Daily. (Patient not taking: Reported on 8/6/2024), Disp: 30 tablet, Rfl: 11    Radiology/Endoscopy:    US Abdomen 7/1/24  IMPRESSION:  Cholelithiasis without sonographic evidence of acute cholecystitis    Labs:    Recent labs reviewed    Review of Systems:   As noted above in HPI    Physical Exam:   No acute distress, alert  Nonlabored respirations  Abdomen soft, nontender, nondistended  Extremities warm and well-perfused with no gross deformities    Assessment and Plan:  Evangelist Mcdonald is a 34 y.o. female with symptomatic cholelithiasis.    - Given patient is symptomatic cholecystectomy was offered  - The surgery along with associated risk on benefits, alternatives were discussed with the patient; risk discussed include but are not limited to bleeding, infection, hernia, conversion to open, possible bile duct injury requiring further surgeries  - Patient expressed understanding of everything discussed and is agreeable to proceed with surgery  - Patient to be scheduled for robotic assisted laparoscopic cholecystectomy, possible open    Herlinda Bella MD  General Surgery

## 2024-08-15 ENCOUNTER — LAB (OUTPATIENT)
Dept: LAB | Facility: HOSPITAL | Age: 34
End: 2024-08-15
Payer: OTHER GOVERNMENT

## 2024-08-15 LAB
BASOPHILS # BLD AUTO: 0.04 10*3/MM3 (ref 0–0.2)
BASOPHILS NFR BLD AUTO: 0.7 % (ref 0–1.5)
DEPRECATED RDW RBC AUTO: 41.5 FL (ref 37–54)
EOSINOPHIL # BLD AUTO: 0.3 10*3/MM3 (ref 0–0.4)
EOSINOPHIL NFR BLD AUTO: 4.9 % (ref 0.3–6.2)
ERYTHROCYTE [DISTWIDTH] IN BLOOD BY AUTOMATED COUNT: 12.6 % (ref 12.3–15.4)
HCT VFR BLD AUTO: 39.9 % (ref 34–46.6)
HGB BLD-MCNC: 13.4 G/DL (ref 12–15.9)
IMM GRANULOCYTES # BLD AUTO: 0.01 10*3/MM3 (ref 0–0.05)
IMM GRANULOCYTES NFR BLD AUTO: 0.2 % (ref 0–0.5)
LYMPHOCYTES # BLD AUTO: 1.82 10*3/MM3 (ref 0.7–3.1)
LYMPHOCYTES NFR BLD AUTO: 29.9 % (ref 19.6–45.3)
MCH RBC QN AUTO: 30.3 PG (ref 26.6–33)
MCHC RBC AUTO-ENTMCNC: 33.6 G/DL (ref 31.5–35.7)
MCV RBC AUTO: 90.3 FL (ref 79–97)
MONOCYTES # BLD AUTO: 0.42 10*3/MM3 (ref 0.1–0.9)
MONOCYTES NFR BLD AUTO: 6.9 % (ref 5–12)
NEUTROPHILS NFR BLD AUTO: 3.49 10*3/MM3 (ref 1.7–7)
NEUTROPHILS NFR BLD AUTO: 57.4 % (ref 42.7–76)
NRBC BLD AUTO-RTO: 0 /100 WBC (ref 0–0.2)
PLATELET # BLD AUTO: 247 10*3/MM3 (ref 140–450)
PMV BLD AUTO: 9.7 FL (ref 6–12)
RBC # BLD AUTO: 4.42 10*6/MM3 (ref 3.77–5.28)
WBC NRBC COR # BLD AUTO: 6.08 10*3/MM3 (ref 3.4–10.8)

## 2024-08-15 PROCEDURE — 36415 COLL VENOUS BLD VENIPUNCTURE: CPT | Performed by: SURGERY

## 2024-08-15 PROCEDURE — 85025 COMPLETE CBC W/AUTO DIFF WBC: CPT | Performed by: SURGERY

## 2024-08-27 ENCOUNTER — TELEPHONE (OUTPATIENT)
Dept: SURGERY | Facility: CLINIC | Age: 34
End: 2024-08-27
Payer: OTHER GOVERNMENT

## 2024-08-27 NOTE — TELEPHONE ENCOUNTER
Hub staff attempted to follow warm transfer process and was unsuccessful     Caller: Evangelist Mcdonald    Relationship to patient: Self    Best call back number: [phone]     Patient is needing: HAS ALLERGIES AND WANTS TO KNOW IF THIS WILL CHANGE SURG/PROC

## 2024-08-28 NOTE — TELEPHONE ENCOUNTER
Call placed to patient, asked if it is just seasonal allergies or is she running a fever. Patient reports she just has sinus drainage down back of her throat but no fever. Advised since no fever will not change anything with surgery and anesthesia will talk to her prior to procedure. Okay per patient thanked us for calling

## 2024-08-29 ENCOUNTER — HOSPITAL ENCOUNTER (OUTPATIENT)
Facility: HOSPITAL | Age: 34
Setting detail: HOSPITAL OUTPATIENT SURGERY
Discharge: HOME | End: 2024-08-29
Attending: SURGERY | Admitting: SURGERY
Payer: OTHER GOVERNMENT

## 2024-08-29 ENCOUNTER — ANESTHESIA EVENT (OUTPATIENT)
Dept: PERIOP | Facility: HOSPITAL | Age: 34
End: 2024-08-29
Payer: OTHER GOVERNMENT

## 2024-08-29 ENCOUNTER — ANESTHESIA (OUTPATIENT)
Dept: PERIOP | Facility: HOSPITAL | Age: 34
End: 2024-08-29
Payer: OTHER GOVERNMENT

## 2024-08-29 VITALS
TEMPERATURE: 97.6 F | SYSTOLIC BLOOD PRESSURE: 92 MMHG | WEIGHT: 125 LBS | HEIGHT: 64 IN | DIASTOLIC BLOOD PRESSURE: 59 MMHG | BODY MASS INDEX: 21.34 KG/M2 | RESPIRATION RATE: 19 BRPM | OXYGEN SATURATION: 95 % | HEART RATE: 78 BPM

## 2024-08-29 DIAGNOSIS — K80.20 SYMPTOMATIC CHOLELITHIASIS: ICD-10-CM

## 2024-08-29 LAB — B-HCG UR QL: NEGATIVE

## 2024-08-29 PROCEDURE — 25010000002 HYDROMORPHONE 1 MG/ML SOLUTION: Performed by: NURSE ANESTHETIST, CERTIFIED REGISTERED

## 2024-08-29 PROCEDURE — 25010000002 ONDANSETRON PER 1 MG: Performed by: NURSE ANESTHETIST, CERTIFIED REGISTERED

## 2024-08-29 PROCEDURE — 25010000002 DEXAMETHASONE PER 1 MG: Performed by: NURSE ANESTHETIST, CERTIFIED REGISTERED

## 2024-08-29 PROCEDURE — 25010000002 KETOROLAC TROMETHAMINE PER 15 MG: Performed by: NURSE ANESTHETIST, CERTIFIED REGISTERED

## 2024-08-29 PROCEDURE — 25810000003 SODIUM CHLORIDE 0.9 % SOLUTION: Performed by: SURGERY

## 2024-08-29 PROCEDURE — 81025 URINE PREGNANCY TEST: CPT | Performed by: SURGERY

## 2024-08-29 PROCEDURE — 25810000003 SODIUM CHLORIDE 0.9 % SOLUTION: Performed by: NURSE ANESTHETIST, CERTIFIED REGISTERED

## 2024-08-29 PROCEDURE — 25010000002 MAGNESIUM SULFATE PER 500 MG OF MAGNESIUM: Performed by: NURSE ANESTHETIST, CERTIFIED REGISTERED

## 2024-08-29 PROCEDURE — 25010000002 PROPOFOL 200 MG/20ML EMULSION: Performed by: NURSE ANESTHETIST, CERTIFIED REGISTERED

## 2024-08-29 PROCEDURE — 88304 TISSUE EXAM BY PATHOLOGIST: CPT | Performed by: SURGERY

## 2024-08-29 PROCEDURE — 25010000002 SUGAMMADEX 200 MG/2ML SOLUTION: Performed by: NURSE ANESTHETIST, CERTIFIED REGISTERED

## 2024-08-29 PROCEDURE — 25010000002 BUPIVACAINE 0.25 % SOLUTION: Performed by: SURGERY

## 2024-08-29 PROCEDURE — 25010000002 CLINDAMYCIN PER 300 MG: Performed by: SURGERY

## 2024-08-29 PROCEDURE — 25010000002 INDOCYANINE GREEN 25 MG RECONSTITUTED SOLUTION: Performed by: SURGERY

## 2024-08-29 PROCEDURE — 25010000002 FENTANYL CITRATE (PF) 100 MCG/2ML SOLUTION: Performed by: NURSE ANESTHETIST, CERTIFIED REGISTERED

## 2024-08-29 PROCEDURE — 25010000002 GLYCOPYRROLATE 1 MG/5ML SOLUTION: Performed by: NURSE ANESTHETIST, CERTIFIED REGISTERED

## 2024-08-29 DEVICE — MEDIUM-LARGE LIGATION CLIPS 6 CLIPS/CART
Type: IMPLANTABLE DEVICE | Site: ABDOMEN | Status: FUNCTIONAL
Brand: VAS-Q-CLIP

## 2024-08-29 RX ORDER — LABETALOL HYDROCHLORIDE 5 MG/ML
5 INJECTION, SOLUTION INTRAVENOUS
Status: DISCONTINUED | OUTPATIENT
Start: 2024-08-29 | End: 2024-08-29 | Stop reason: HOSPADM

## 2024-08-29 RX ORDER — INDOCYANINE GREEN AND WATER 25 MG
5 KIT INJECTION ONCE
Status: COMPLETED | OUTPATIENT
Start: 2024-08-29 | End: 2024-08-29

## 2024-08-29 RX ORDER — SODIUM CHLORIDE 9 MG/ML
40 INJECTION, SOLUTION INTRAVENOUS AS NEEDED
Status: DISCONTINUED | OUTPATIENT
Start: 2024-08-29 | End: 2024-08-29 | Stop reason: HOSPADM

## 2024-08-29 RX ORDER — FLUMAZENIL 0.1 MG/ML
0.2 INJECTION INTRAVENOUS AS NEEDED
Status: DISCONTINUED | OUTPATIENT
Start: 2024-08-29 | End: 2024-08-29 | Stop reason: HOSPADM

## 2024-08-29 RX ORDER — NALOXONE HCL 0.4 MG/ML
0.4 VIAL (ML) INJECTION AS NEEDED
Status: DISCONTINUED | OUTPATIENT
Start: 2024-08-29 | End: 2024-08-29 | Stop reason: HOSPADM

## 2024-08-29 RX ORDER — DEXMEDETOMIDINE HYDROCHLORIDE 100 UG/ML
INJECTION, SOLUTION INTRAVENOUS AS NEEDED
Status: DISCONTINUED | OUTPATIENT
Start: 2024-08-29 | End: 2024-08-29 | Stop reason: SURG

## 2024-08-29 RX ORDER — SODIUM CHLORIDE 0.9 % (FLUSH) 0.9 %
3-10 SYRINGE (ML) INJECTION AS NEEDED
Status: DISCONTINUED | OUTPATIENT
Start: 2024-08-29 | End: 2024-08-29 | Stop reason: HOSPADM

## 2024-08-29 RX ORDER — ONDANSETRON 4 MG/1
4 TABLET, FILM COATED ORAL DAILY PRN
Qty: 15 TABLET | Refills: 1 | Status: SHIPPED | OUTPATIENT
Start: 2024-08-29 | End: 2025-08-29

## 2024-08-29 RX ORDER — OXYCODONE HYDROCHLORIDE 5 MG/1
10 TABLET ORAL EVERY 4 HOURS PRN
Status: COMPLETED | OUTPATIENT
Start: 2024-08-29 | End: 2024-08-29

## 2024-08-29 RX ORDER — PHENYLEPHRINE HCL IN 0.9% NACL 1 MG/10 ML
SYRINGE (ML) INTRAVENOUS AS NEEDED
Status: DISCONTINUED | OUTPATIENT
Start: 2024-08-29 | End: 2024-08-29 | Stop reason: SURG

## 2024-08-29 RX ORDER — OXYCODONE HYDROCHLORIDE 5 MG/1
5 TABLET ORAL ONCE AS NEEDED
Status: DISCONTINUED | OUTPATIENT
Start: 2024-08-29 | End: 2024-08-29 | Stop reason: HOSPADM

## 2024-08-29 RX ORDER — ONDANSETRON 2 MG/ML
INJECTION INTRAMUSCULAR; INTRAVENOUS AS NEEDED
Status: DISCONTINUED | OUTPATIENT
Start: 2024-08-29 | End: 2024-08-29 | Stop reason: SURG

## 2024-08-29 RX ORDER — DIPHENHYDRAMINE HYDROCHLORIDE 50 MG/ML
12.5 INJECTION INTRAMUSCULAR; INTRAVENOUS
Status: DISCONTINUED | OUTPATIENT
Start: 2024-08-29 | End: 2024-08-29 | Stop reason: HOSPADM

## 2024-08-29 RX ORDER — SODIUM CHLORIDE 9 MG/ML
100 INJECTION, SOLUTION INTRAVENOUS CONTINUOUS
Status: DISCONTINUED | OUTPATIENT
Start: 2024-08-29 | End: 2024-08-29 | Stop reason: HOSPADM

## 2024-08-29 RX ORDER — SODIUM CHLORIDE 0.9 % (FLUSH) 0.9 %
3 SYRINGE (ML) INJECTION EVERY 12 HOURS SCHEDULED
Status: DISCONTINUED | OUTPATIENT
Start: 2024-08-29 | End: 2024-08-29 | Stop reason: HOSPADM

## 2024-08-29 RX ORDER — FENTANYL CITRATE 50 UG/ML
INJECTION, SOLUTION INTRAMUSCULAR; INTRAVENOUS AS NEEDED
Status: DISCONTINUED | OUTPATIENT
Start: 2024-08-29 | End: 2024-08-29 | Stop reason: SURG

## 2024-08-29 RX ORDER — LIDOCAINE HYDROCHLORIDE 20 MG/ML
INJECTION, SOLUTION EPIDURAL; INFILTRATION; INTRACAUDAL; PERINEURAL AS NEEDED
Status: DISCONTINUED | OUTPATIENT
Start: 2024-08-29 | End: 2024-08-29 | Stop reason: SURG

## 2024-08-29 RX ORDER — HYDRALAZINE HYDROCHLORIDE 20 MG/ML
5 INJECTION INTRAMUSCULAR; INTRAVENOUS
Status: DISCONTINUED | OUTPATIENT
Start: 2024-08-29 | End: 2024-08-29 | Stop reason: HOSPADM

## 2024-08-29 RX ORDER — EPHEDRINE SULFATE 5 MG/ML
5 INJECTION INTRAVENOUS ONCE AS NEEDED
Status: DISCONTINUED | OUTPATIENT
Start: 2024-08-29 | End: 2024-08-29 | Stop reason: HOSPADM

## 2024-08-29 RX ORDER — BUPIVACAINE HYDROCHLORIDE 2.5 MG/ML
INJECTION, SOLUTION INFILTRATION; PERINEURAL AS NEEDED
Status: DISCONTINUED | OUTPATIENT
Start: 2024-08-29 | End: 2024-08-29 | Stop reason: HOSPADM

## 2024-08-29 RX ORDER — HYDROCODONE BITARTRATE AND ACETAMINOPHEN 5; 325 MG/1; MG/1
1 TABLET ORAL EVERY 6 HOURS PRN
Qty: 15 TABLET | Refills: 0 | Status: SHIPPED | OUTPATIENT
Start: 2024-08-29 | End: 2024-09-03

## 2024-08-29 RX ORDER — PROPOFOL 10 MG/ML
INJECTION, EMULSION INTRAVENOUS AS NEEDED
Status: DISCONTINUED | OUTPATIENT
Start: 2024-08-29 | End: 2024-08-29 | Stop reason: SURG

## 2024-08-29 RX ORDER — ONDANSETRON 2 MG/ML
4 INJECTION INTRAMUSCULAR; INTRAVENOUS ONCE AS NEEDED
Status: COMPLETED | OUTPATIENT
Start: 2024-08-29 | End: 2024-08-29

## 2024-08-29 RX ORDER — CLINDAMYCIN PHOSPHATE 900 MG/50ML
900 INJECTION, SOLUTION INTRAVENOUS ONCE
Status: COMPLETED | OUTPATIENT
Start: 2024-08-29 | End: 2024-08-29

## 2024-08-29 RX ORDER — SODIUM CHLORIDE 9 MG/ML
INJECTION, SOLUTION INTRAVENOUS CONTINUOUS PRN
Status: DISCONTINUED | OUTPATIENT
Start: 2024-08-29 | End: 2024-08-29 | Stop reason: SURG

## 2024-08-29 RX ORDER — GLYCOPYRROLATE 0.2 MG/ML
INJECTION INTRAMUSCULAR; INTRAVENOUS AS NEEDED
Status: DISCONTINUED | OUTPATIENT
Start: 2024-08-29 | End: 2024-08-29 | Stop reason: SURG

## 2024-08-29 RX ORDER — DEXAMETHASONE SODIUM PHOSPHATE 4 MG/ML
INJECTION, SOLUTION INTRA-ARTICULAR; INTRALESIONAL; INTRAMUSCULAR; INTRAVENOUS; SOFT TISSUE AS NEEDED
Status: DISCONTINUED | OUTPATIENT
Start: 2024-08-29 | End: 2024-08-29 | Stop reason: SURG

## 2024-08-29 RX ORDER — ROCURONIUM BROMIDE 10 MG/ML
INJECTION, SOLUTION INTRAVENOUS AS NEEDED
Status: DISCONTINUED | OUTPATIENT
Start: 2024-08-29 | End: 2024-08-29 | Stop reason: SURG

## 2024-08-29 RX ORDER — KETOROLAC TROMETHAMINE 30 MG/ML
INJECTION, SOLUTION INTRAMUSCULAR; INTRAVENOUS AS NEEDED
Status: DISCONTINUED | OUTPATIENT
Start: 2024-08-29 | End: 2024-08-29 | Stop reason: SURG

## 2024-08-29 RX ORDER — MAGNESIUM SULFATE HEPTAHYDRATE 500 MG/ML
INJECTION, SOLUTION INTRAMUSCULAR; INTRAVENOUS AS NEEDED
Status: DISCONTINUED | OUTPATIENT
Start: 2024-08-29 | End: 2024-08-29 | Stop reason: SURG

## 2024-08-29 RX ORDER — IPRATROPIUM BROMIDE AND ALBUTEROL SULFATE 2.5; .5 MG/3ML; MG/3ML
3 SOLUTION RESPIRATORY (INHALATION) ONCE AS NEEDED
Status: DISCONTINUED | OUTPATIENT
Start: 2024-08-29 | End: 2024-08-29 | Stop reason: HOSPADM

## 2024-08-29 RX ORDER — DIPHENHYDRAMINE HYDROCHLORIDE 50 MG/ML
12.5 INJECTION INTRAMUSCULAR; INTRAVENOUS ONCE AS NEEDED
Status: DISCONTINUED | OUTPATIENT
Start: 2024-08-29 | End: 2024-08-29 | Stop reason: HOSPADM

## 2024-08-29 RX ADMIN — OXYCODONE HYDROCHLORIDE 10 MG: 5 TABLET ORAL at 10:44

## 2024-08-29 RX ADMIN — DEXMEDETOMIDINE HYDROCHLORIDE 10 MCG: 100 INJECTION, SOLUTION, CONCENTRATE INTRAVENOUS at 07:35

## 2024-08-29 RX ADMIN — HYDROMORPHONE HYDROCHLORIDE 0.5 MG: 1 INJECTION, SOLUTION INTRAMUSCULAR; INTRAVENOUS; SUBCUTANEOUS at 08:50

## 2024-08-29 RX ADMIN — ONDANSETRON 4 MG: 2 INJECTION INTRAMUSCULAR; INTRAVENOUS at 08:37

## 2024-08-29 RX ADMIN — FENTANYL CITRATE 100 MCG: 50 INJECTION, SOLUTION INTRAMUSCULAR; INTRAVENOUS at 07:35

## 2024-08-29 RX ADMIN — LIDOCAINE HYDROCHLORIDE 80 MG: 20 INJECTION, SOLUTION EPIDURAL; INFILTRATION; INTRACAUDAL; PERINEURAL at 07:35

## 2024-08-29 RX ADMIN — INDOCYANINE GREEN AND WATER 5 MG: KIT at 06:42

## 2024-08-29 RX ADMIN — SODIUM CHLORIDE 100 ML/HR: 9 INJECTION, SOLUTION INTRAVENOUS at 07:21

## 2024-08-29 RX ADMIN — GLYCOPYRROLATE 0.2 MCG: 0.2 INJECTION INTRAMUSCULAR; INTRAVENOUS at 07:58

## 2024-08-29 RX ADMIN — SUGAMMADEX 200 MG: 100 INJECTION, SOLUTION INTRAVENOUS at 08:42

## 2024-08-29 RX ADMIN — PROPOFOL 150 MG: 10 INJECTION, EMULSION INTRAVENOUS at 07:35

## 2024-08-29 RX ADMIN — HYDROMORPHONE HYDROCHLORIDE 0.5 MG: 1 INJECTION, SOLUTION INTRAMUSCULAR; INTRAVENOUS; SUBCUTANEOUS at 09:13

## 2024-08-29 RX ADMIN — DEXAMETHASONE SODIUM PHOSPHATE 8 MG: 4 INJECTION, SOLUTION INTRAMUSCULAR; INTRAVENOUS at 07:51

## 2024-08-29 RX ADMIN — KETOROLAC TROMETHAMINE 15 MG: 30 INJECTION, SOLUTION INTRAMUSCULAR at 08:37

## 2024-08-29 RX ADMIN — CLINDAMYCIN PHOSPHATE 900 MG: 900 INJECTION, SOLUTION INTRAVENOUS at 07:21

## 2024-08-29 RX ADMIN — ONDANSETRON 4 MG: 2 INJECTION INTRAMUSCULAR; INTRAVENOUS at 09:04

## 2024-08-29 RX ADMIN — Medication 30 MG: at 07:48

## 2024-08-29 RX ADMIN — ROCURONIUM BROMIDE 50 MG: 10 INJECTION, SOLUTION INTRAVENOUS at 07:35

## 2024-08-29 RX ADMIN — MAGNESIUM SULFATE HEPTAHYDRATE 2 G: 500 INJECTION, SOLUTION INTRAMUSCULAR; INTRAVENOUS at 08:20

## 2024-08-29 RX ADMIN — SODIUM CHLORIDE: 9 INJECTION, SOLUTION INTRAVENOUS at 07:29

## 2024-08-29 RX ADMIN — HYDROMORPHONE HYDROCHLORIDE 0.5 MG: 1 INJECTION, SOLUTION INTRAMUSCULAR; INTRAVENOUS; SUBCUTANEOUS at 08:54

## 2024-08-29 RX ADMIN — Medication 100 MCG: at 08:09

## 2024-08-29 RX ADMIN — GLYCOPYRROLATE 0.1 MCG: 0.2 INJECTION INTRAMUSCULAR; INTRAVENOUS at 08:00

## 2024-08-29 NOTE — ANESTHESIA PROCEDURE NOTES
Airway  Urgency: elective    Date/Time: 8/29/2024 7:36 AM    General Information and Staff    Patient location during procedure: OR  CRNA/CAA: Martha Flores CRNA    Indications and Patient Condition  Indications for airway management: airway protection    Preoxygenated: yes  Mask difficulty assessment: 1 - vent by mask    Final Airway Details  Final airway type: endotracheal airway      Successful airway: ETT  Cuffed: yes   Successful intubation technique: video laryngoscopy  Facilitating devices/methods: intubating stylet  Endotracheal tube insertion site: oral  Blade: Denise  Blade size: 3  ETT size (mm): 7.0  Cormack-Lehane Classification: grade I - full view of glottis  Placement verified by: chest auscultation and capnometry   Measured from: lips  ETT/EBT  to lips (cm): 21  Number of attempts at approach: 1  Assessment: lips, teeth, and gum same as pre-op and atraumatic intubation

## 2024-08-29 NOTE — DISCHARGE INSTRUCTIONS
Call the office to schedule followup appointment approx 2 wks postop  Keep incisions dry for first 24-48 hrs then may remove overlying bandages but leave white steristrips in place  May shower soap and water after bandages are removed but no baths/soaking x 2 weeks  Low fat diet x 2 wks  No lifting >10-15 lbs x 2 wks  Over the counter stool softener twice daily until off narcotics and having regular bowel movements  Milk of magnesia as needed if still constipated with stool softeners

## 2024-08-29 NOTE — ANESTHESIA PREPROCEDURE EVALUATION
Anesthesia Evaluation     NPO Solid Status: > 8 hours  NPO Liquid Status: > 8 hours           Airway   Mallampati: I  TM distance: >3 FB  Neck ROM: full  No difficulty expected  Dental - normal exam     Pulmonary - normal exam   Cardiovascular - normal exam        Neuro/Psych  (+) dizziness/light headedness, psychiatric history Anxiety  GI/Hepatic/Renal/Endo    (+) GERD well controlled    Musculoskeletal     Abdominal  - normal exam    Bowel sounds: normal.   Substance History      OB/GYN          Other                    Anesthesia Plan    ASA 2     general     intravenous induction     Anesthetic plan, risks, benefits, and alternatives have been provided, discussed and informed consent has been obtained with: patient.  Pre-procedure education provided  Plan discussed with CRNA.    CODE STATUS:

## 2024-08-30 ENCOUNTER — TELEPHONE (OUTPATIENT)
Dept: SURGERY | Facility: CLINIC | Age: 34
End: 2024-08-30
Payer: OTHER GOVERNMENT

## 2024-08-30 NOTE — OP NOTE
Operative Report    Patient Name:  Evangelist Mcdonald  YOB: 1990    Date of Surgery:  8/29/2024    Pre-op Diagnosis:   Symptomatic cholelithiasis [K80.20]       Post-op Diagnosis:   Symptomatic cholelithiasis [K80.20]    Procedure:  Robotic assisted laparoscopic cholecystectomy    Staff:  Surgeon(s):  Herlinda Bella MD    Circulator: Rima Beck RN; Ambar Royal RN  Scrub Person: Celine Fairchild  Assistant: Shaniqua Pack CSA  was responsible for performing the following activities: Closing, Placing Dressing, Held/Positioned Camera, and bedside assist for robotics case  and their skilled assistance was necessary for the success of this case.    Anesthesia: General    Estimated Blood Loss: minimal    Implants:    None    Specimen:          Specimens       ID Source Type Tests Collected By Collected At Frozen?    A Gallbladder Tissue TISSUE PATHOLOGY EXAM   Herlinda Bella MD 8/29/24 0838 No    Description: GALLBLADDER          Findings: Chronically thickened peritoneal attachments, cholelithiasis.  Critical view obtained.    Complications: None immediate    Clinical Indications: The patient is a 34 year old female with right upper quadrant pain. Workup showed cholelithiasis and cholecystectomy was offered. The patient presents today for cholecystectomy. The surgery along with the associated risks, benefits, and alternatives were discussed with the patient. The risks include but are not limited to bleeding, infection, hernia, conversion to open, and common bile duct injury requiring additional procedures. The patient expressed understanding and wished to proceed. Informed consent was obtained.    Description of Procedure:  The patient was brought to the operating room and placed in the supine position with both arms out. Bilateral sequential compression stockings placed on the lower extremities. The patient was induced and intubated by the Anesthesia service. Perioperative antibiotics were administered.  The patient's abdomen was then prepped and draped in the usual sterile fashion. A time out was performed.    After confirming with anesthesia that an orogastric tube was in place and to suction we made a small stab incision in the left upper quadrant at [facility] point. A water drop test was performed indicating we were likely intra-abdominal. Insufflation was initiated and a low opening pressure reassured us that we were intra-abdominal. We placed an 8mm robotic trocar just above the umbilicus. The camera was introduced and the abdomen was inspected to ensure we had not caused any injuries with placement of the veress needle or the initial trocar. Under direct visualization we placed a trocar one handbreadth to the left of the umbilicus and another one handbreadth to the right of the umbilicus. A fourth 8mm robotic trocar was placed in the left lateral subcostal region. The patient was placed in reverse Trendelenburg position with the right side up. The robot was docked and the camera and instruments loaded and positioned under direct visualization. At this point I sat down at the console to begin the dissection. The fundus of the gallbladder was retracted cephalad and laterally. Peritoneal attachments were taken down staying high on the gallbladder and away from the common bile duct. The cystic artery and cystic duct were dissected out and the critical view was obtained.  The inferior half of the gallbladder was dissected away from the liver revealing only two structures going directly to the gallbladder.  We were also able to see the junction of the cystic duct with the common bile duct.  The cystic artery and duct were clipped and divided. The gallbladder was then dissected from the gallbladder fossa with electrocautery. The gallbladder fossa was inspected and was hemostatic. The clips were inspected and were in good position. The gallbladder was placed in an endocatch bag and removed through the trocar site to  the left of the umbilicus without difficulty and without expanding the fascial incision.  The fascial defect was small and a fingertip could not be inserted so we elected to not place a transfascial stitch.  The robot was undocked and the remaining ports were removed under direct visualization except for the port in the midline and all were hemostatic. Pneumoperitoneum was released and the supraumbilical trocar was removed.  The incisions were closed with 4-0 vicryl, cleaned, and dressed with sterile dressings.    The patient tolerated the procedure well. She was awakened and extubated by anesthesia and then transferred to the recovery room in stable condition. At the completion of the case, all instrument, needle, and sponge counts were correct.    Herlinda Bella MD     Date: 8/29/2024  Time: 20:56 EDT

## 2024-08-30 NOTE — TELEPHONE ENCOUNTER
I called Evangelist Mcdonald to check on them post operatively. We discussed instructions and post op office visit. She was vomiting yesterday and zofran made it worse. She took a phenergan and it got better. Feeling better today. Knows about appt with Mckenna hauser.  Encouraged to call the office with any other questions.

## 2024-09-03 LAB
LAB AP CASE REPORT: NORMAL
PATH REPORT.FINAL DX SPEC: NORMAL
PATH REPORT.GROSS SPEC: NORMAL

## 2024-09-05 ENCOUNTER — TELEPHONE (OUTPATIENT)
Dept: SURGERY | Facility: CLINIC | Age: 34
End: 2024-09-05
Payer: OTHER GOVERNMENT

## 2024-09-05 NOTE — TELEPHONE ENCOUNTER
Call from patient, reports the steri strips over the umbilical incision have come off already. States it is opening up but no drainage seeing skin. Checked with physician in office, Dr. Gann, states okay to just place bandage over the incision not to try to get to re-close. Still okay to take a shower. Advised as per Dr. Gann. Patient expressed understanding of all discussed.

## 2024-09-16 ENCOUNTER — OFFICE VISIT (OUTPATIENT)
Dept: SURGERY | Facility: CLINIC | Age: 34
End: 2024-09-16
Payer: OTHER GOVERNMENT

## 2024-09-16 VITALS
SYSTOLIC BLOOD PRESSURE: 111 MMHG | WEIGHT: 162 LBS | BODY MASS INDEX: 27.66 KG/M2 | OXYGEN SATURATION: 98 % | HEIGHT: 64 IN | HEART RATE: 77 BPM | TEMPERATURE: 97.7 F | DIASTOLIC BLOOD PRESSURE: 79 MMHG

## 2024-09-16 DIAGNOSIS — K80.20 SYMPTOMATIC CHOLELITHIASIS: Primary | ICD-10-CM

## 2024-09-16 PROCEDURE — 99024 POSTOP FOLLOW-UP VISIT: CPT

## 2024-09-23 ENCOUNTER — CLINICAL SUPPORT (OUTPATIENT)
Dept: FAMILY MEDICINE CLINIC | Facility: CLINIC | Age: 34
End: 2024-09-23
Payer: OTHER GOVERNMENT

## 2024-09-23 DIAGNOSIS — Z11.1 VISIT FOR TB SKIN TEST: Primary | ICD-10-CM

## 2024-09-23 PROCEDURE — 86580 TB INTRADERMAL TEST: CPT | Performed by: FAMILY MEDICINE

## 2024-09-25 ENCOUNTER — CLINICAL SUPPORT (OUTPATIENT)
Dept: FAMILY MEDICINE CLINIC | Facility: CLINIC | Age: 34
End: 2024-09-25
Payer: OTHER GOVERNMENT

## 2024-09-25 DIAGNOSIS — Z11.1 VISIT FOR TB SKIN TEST: Primary | ICD-10-CM

## 2024-09-25 LAB
INDURATION: 0 MM (ref 0–10)
Lab: NORMAL
Lab: NORMAL
TB SKIN TEST: NEGATIVE

## 2024-11-04 NOTE — PROGRESS NOTES
Subjective   Evangelist Mcdonald is a 34 y.o. female. Presents to [facility]    Chief Complaint   Patient presents with    nerve pain    Heartburn       History of Present Illness  Evangelist was seen at [facility] on 10/28/24. She was seen for vomiting and pulled sternum muscle. Labs that were performed during the encounter included: labs  Diagnostic studies that were performed included: Chest x-ray-normal. Medication changes: Zofran,sucralfate and omeprazole .   Heartburn  She reports no abdominal pain, no chest pain, no choking, no coughing, no early satiety, no heartburn, no nausea, no sore throat or no wheezing. This is a new problem. The current episode started 1 to 4 weeks ago. The problem occurs occasionally. The problem has been resolved. The heartburn duration is less than a minute. The heartburn does not wake her from sleep. The heartburn does not limit her activity. The heartburn doesn't change with position. Nothing aggravates the symptoms. Pertinent negatives include no fatigue or weight loss. She has tried an antacid (gasX) for the symptoms. The treatment provided mild relief.   Hand Pain   The incident occurred more than 1 week ago. There was no injury mechanism. The pain is present in the left hand and right hand. The quality of the pain is described as aching. The pain is mild. The pain has been Intermittent since the incident. Pertinent negatives include no chest pain, numbness or tingling. Nothing aggravates the symptoms. She has tried nothing for the symptoms.      She was having nerve pain in the ulnar portion of her hand and also has had in her elbow.     Gas x helped her stomach pain    I personally reviewed and updated the patient's allergies, medications, problem list, and past medical, surgical, social, and family history. I have reviewed and confirmed the accuracy of the History of Present Illness and Review of Symptoms as documented by the MA/LPN/RN. Tanja Us  MD [place]    Allergies:  Allergies   Allergen Reactions    Ceftriaxone Hives, Itching, Swelling and Rash     Beta lactam allergy details  Antibiotic reaction: hives, rash, swelling  Age at reaction: unknown  Dose to reaction time: unknown  Reason for antibiotic: unknown  Epinephrine required for reaction?: unknown  Tolerated antibiotics: unknown       Sulfa Antibiotics Hives    Ciprofloxacin Hives       Social History:  Social History     Socioeconomic History    Marital status:    Tobacco Use    Smoking status: Never     Passive exposure: Never    Smokeless tobacco: Never   Vaping Use    Vaping status: Never Used   Substance and Sexual Activity    Alcohol use: No    Drug use: No    Sexual activity: Defer       Family History:  Family History   Problem Relation Age of Onset    Diabetes Maternal Grandfather         mellitus    Heart disease Maternal Grandfather         ischemic    Cancer Paternal Grandfather         ?    Heart disease Paternal Grandfather         ischemic    Bipolar disorder Cousin        Past Medical History :  Patient Active Problem List   Diagnosis    Generalized anxiety disorder    Elevated C-reactive protein    Fibrocystic breast disease    History of varicella    Other bladder disorder    Other specific developmental learning difficulties    Seasonal allergic rhinitis due to pollen    Vesicoureteral reflux    Miliaria rubra    Borderline open-angle glaucoma    Pruritus    GERD (gastroesophageal reflux disease)    Alternating constipation and diarrhea    Herpes simplex of female genitalia    Encounter for general adult medical examination with abnormal findings    Screening for hyperlipidemia    POTS (postural orthostatic tachycardia syndrome)    Chronic interstitial cystitis    Need for Tdap vaccination    Overweight (BMI 25.0-29.9)    Vitamin D deficiency    Lateral epicondylitis of right elbow       Medication List:    Current Outpatient Medications:     Prenatal Vit-DSS-Fe Cbn-FA  "(PRENATAL AD PO), Take  by mouth., Disp: , Rfl:     valACYclovir (VALTREX) 500 MG tablet, Take 1 tablet by mouth Daily. (Patient taking differently: Take 1 tablet by mouth Daily As Needed.), Disp: 30 tablet, Rfl: 12    pantoprazole (PROTONIX) 40 MG EC tablet, Take 1 tablet by mouth Daily., Disp: 30 tablet, Rfl: 2    sucralfate (Carafate) 1 GM/10ML suspension, Take 10 mL by mouth 3 (Three) Times a Day., Disp: 473 mL, Rfl: 0    Past Surgical History:  Past Surgical History:   Procedure Laterality Date    CHOLECYSTECTOMY N/A 8/29/2024    Procedure: Robotic assisted laparoscopic cholecystectomy;  Surgeon: Herlinda Bella MD;  Location: [facility];  Service: Robotics - [place] [facility];  Laterality: N/A;         Physical Exam:      Vital Signs:    Vitals:    11/11/24 1122   BP: 118/74   Pulse: 91   Resp: 17   Temp: 97.8 °F (36.6 °C)   SpO2: 99%        /74 (BP Location: Right arm, Patient Position: Sitting, Cuff Size: Adult)   Pulse 91   Temp 97.8 °F (36.6 °C) (Temporal)   Resp 17   Ht 162.6 cm (64\")   Wt 73.4 kg (161 lb 12.8 oz)   LMP 10/27/2024   SpO2 99% Comment: ra  BMI 27.77 kg/m²     Wt Readings from Last 3 Encounters:   11/11/24 73.4 kg (161 lb 12.8 oz)   09/16/24 73.5 kg (162 lb)   08/29/24 56.7 kg (125 lb)       Result Review :   The following data was reviewed by: Tanja Caro MD on 11/11/2024:         [facility] on 10/28/24. She was seen for vomiting and pulled sternum muscle. Labs that were performed during the encounter included: labs Diagnostic studies that were performed included: Chest x-ray-normal. Medication changes: Zofran,sucralfate and omeprazole    Physical Exam  Vitals reviewed.   Constitutional:       Appearance: Normal appearance. She is well-developed.   HENT:      Head: Normocephalic and atraumatic.   Eyes:      General:         Right eye: No discharge.         Left eye: No discharge.   Cardiovascular:      Rate and Rhythm: Normal rate and regular rhythm.      Heart " sounds: Normal heart sounds. No murmur heard.     No friction rub. No gallop.   Pulmonary:      Effort: Pulmonary effort is normal. No respiratory distress.      Breath sounds: Normal breath sounds. No wheezing or rales.   Abdominal:      General: Abdomen is flat. Bowel sounds are normal. There is no distension.      Palpations: Abdomen is soft. There is no mass.      Tenderness: There is no abdominal tenderness. There is no guarding or rebound.      Hernia: No hernia is present.   Musculoskeletal:      Right elbow: No swelling or deformity. Normal range of motion.   Skin:     General: Skin is warm and dry.      Findings: No rash.   Neurological:      Mental Status: She is alert and oriented to person, place, and time.      Coordination: Coordination normal.      Gait: Gait normal.   Psychiatric:         Behavior: Behavior is cooperative.       Assessment and Plan:  Problems Addressed this Visit          Endocrine and Metabolic    Overweight (BMI 25.0-29.9)       Gastrointestinal Abdominal     GERD (gastroesophageal reflux disease) - Primary     Worse  Start protonix and carafate    Limit tobacco, alcohol, caffeine, chocalate, citrus fruits, recumbency after meals and large portions. Discussed link between PPI's and increased risk of hip, wrist, and spine fractures    Follow up in 4 weeks         Relevant Medications    pantoprazole (PROTONIX) 40 MG EC tablet    sucralfate (Carafate) 1 GM/10ML suspension       Musculoskeletal and Injuries    Lateral epicondylitis of right elbow     Diagnosis, treatment and and course discussed. Potential side effects discussed. Return if there is worsening or persistence of symptoms.     No nsaids  Use a brace and tylenol          Other Visit Diagnoses       Bilateral hand pain              Diagnoses         Codes Comments    Gastroesophageal reflux disease without esophagitis    -  Primary ICD-10-CM: K21.9  ICD-9-CM: 530.81     Bilateral hand pain     ICD-10-CM: M79.641,  M79.642  ICD-9-CM: 729.5     Overweight (BMI 25.0-29.9)     ICD-10-CM: E66.3  ICD-9-CM: 278.02     Lateral epicondylitis of right elbow     ICD-10-CM: M77.11  ICD-9-CM: 726.32                          An After Visit Summary and PPPS were given to the patient.       This document is intended for medical expert use only. Reading of this document by patients and/or patient's family without participating medical staff guidance may result in misinterpretation and unintended morbidity. Any interpretation of such data is the responsibility of the patient and/or family member responsible for the patient in concert with their primary or specialist providers, not to be left for sources of online searches such as [employer], [employer] or similar queries. Relying on these approaches to knowledge may result in misinterpretation, misguided goals of care and even death should patients or family members try recommendations outside of the realm of professional medical care.

## 2024-11-11 ENCOUNTER — OFFICE VISIT (OUTPATIENT)
Dept: FAMILY MEDICINE CLINIC | Facility: CLINIC | Age: 34
End: 2024-11-11
Payer: OTHER GOVERNMENT

## 2024-11-11 VITALS
WEIGHT: 161.8 LBS | RESPIRATION RATE: 17 BRPM | BODY MASS INDEX: 27.62 KG/M2 | OXYGEN SATURATION: 99 % | TEMPERATURE: 97.8 F | HEART RATE: 91 BPM | DIASTOLIC BLOOD PRESSURE: 74 MMHG | HEIGHT: 64 IN | SYSTOLIC BLOOD PRESSURE: 118 MMHG

## 2024-11-11 DIAGNOSIS — M79.642 BILATERAL HAND PAIN: ICD-10-CM

## 2024-11-11 DIAGNOSIS — M79.641 BILATERAL HAND PAIN: ICD-10-CM

## 2024-11-11 DIAGNOSIS — M77.11 LATERAL EPICONDYLITIS OF RIGHT ELBOW: ICD-10-CM

## 2024-11-11 DIAGNOSIS — K21.9 GASTROESOPHAGEAL REFLUX DISEASE WITHOUT ESOPHAGITIS: Primary | ICD-10-CM

## 2024-11-11 DIAGNOSIS — E66.3 OVERWEIGHT (BMI 25.0-29.9): ICD-10-CM

## 2024-11-11 PROCEDURE — 99214 OFFICE O/P EST MOD 30 MIN: CPT | Performed by: FAMILY MEDICINE

## 2024-11-11 RX ORDER — SUCRALFATE ORAL 1 G/10ML
1 SUSPENSION ORAL 3 TIMES DAILY
Qty: 473 ML | Refills: 0 | Status: SHIPPED | OUTPATIENT
Start: 2024-11-11

## 2024-11-11 RX ORDER — PANTOPRAZOLE SODIUM 40 MG/1
40 TABLET, DELAYED RELEASE ORAL DAILY
Qty: 30 TABLET | Refills: 2 | Status: SHIPPED | OUTPATIENT
Start: 2024-11-11

## 2024-11-13 NOTE — ASSESSMENT & PLAN NOTE
Worse  Start protonix and carafate    Limit tobacco, alcohol, caffeine, chocalate, citrus fruits, recumbency after meals and large portions. Discussed link between PPI's and increased risk of hip, wrist, and spine fractures    Follow up in 4 weeks

## 2025-01-02 NOTE — PROGRESS NOTES
Subjective   Evangelist Mcdonald is a 34 y.o. female.   Chief Complaint   Patient presents with    URI       Upper Respiratory Infection:   -Started 7 days ago, symptoms are staying about the same  -Symptoms: Nasal congestion, dry cough, sore throat, nausea, occasional headaches, rhinorrhea/postnasal drip, sore throat, fatigue  -Denies: Vomiting, ear pain, sob/wheezing, diarrhea, fevers/chills, body aches  -Home treatment: pseudoephedrine, allegra, ibuprofen, benadryl and honey      Vulvovaginal candidiasis  -Patient reports she gets yeast infections every time she gets antibiotics, requesting treatment for this should we need an antibiotic      The following portions of the patient's history were reviewed and updated as appropriate: allergies, current medications, past family history, past medical history, past social history, past surgical history, and problem list.    Patient Active Problem List   Diagnosis    Generalized anxiety disorder    Elevated C-reactive protein    Fibrocystic breast disease    History of varicella    Other bladder disorder    Other specific developmental learning difficulties    Seasonal allergic rhinitis due to pollen    Vesicoureteral reflux    Miliaria rubra    Borderline open-angle glaucoma    Pruritus    GERD (gastroesophageal reflux disease)    Alternating constipation and diarrhea    Herpes simplex of female genitalia    Encounter for general adult medical examination with abnormal findings    Screening for hyperlipidemia    POTS (postural orthostatic tachycardia syndrome)    Chronic interstitial cystitis    Need for Tdap vaccination    Overweight (BMI 25.0-29.9)    Vitamin D deficiency    Lateral epicondylitis of right elbow       Current Outpatient Medications on File Prior to Visit   Medication Sig Dispense Refill    Prenatal Vit-DSS-Fe Cbn-FA (PRENATAL AD PO) Take  by mouth.      valACYclovir (VALTREX) 500 MG tablet Take 1 tablet by mouth Daily. (Patient taking differently: Take 1  "tablet by mouth Daily As Needed.) 30 tablet 12    pantoprazole (PROTONIX) 40 MG EC tablet Take 1 tablet by mouth Daily. (Patient not taking: Reported on 1/3/2025) 30 tablet 2    sucralfate (Carafate) 1 GM/10ML suspension Take 10 mL by mouth 3 (Three) Times a Day. (Patient not taking: Reported on 1/3/2025) 473 mL 0     No current facility-administered medications on file prior to visit.     Current outpatient and discharge medications have been reconciled for the patient.  Reviewed by: Caterina Pollard DO      Allergies   Allergen Reactions    Ceftriaxone Hives, Itching, Swelling and Rash     Beta lactam allergy details  Antibiotic reaction: hives, rash, swelling  Age at reaction: unknown  Dose to reaction time: unknown  Reason for antibiotic: unknown  Epinephrine required for reaction?: unknown  Tolerated antibiotics: unknown       Sulfa Antibiotics Hives    Ciprofloxacin Hives         Objective   Visit Vitals  BP 92/68 (BP Location: Right arm, Patient Position: Sitting, Cuff Size: Adult)   Pulse 111   Temp 97.1 °F (36.2 °C) (Temporal)   Resp 18   Ht 162.6 cm (64\")   Wt 73 kg (161 lb)   LMP 12/26/2024 (Exact Date)   SpO2 98% Comment: room air   BMI 27.64 kg/m²       Physical Exam  HENT:      Head: Normocephalic and atraumatic.      Ears:      Comments: - Serous fluid behind bilateral tympanic membranes but no erythema, bulging or purulence noted     Mouth/Throat:      Comments: - Cobblestoning and mild erythema of the throat, no exudates purulence or petechia of the palate noted.  Eyes:      Conjunctiva/sclera: Conjunctivae normal.   Cardiovascular:      Rate and Rhythm: Normal rate and regular rhythm.      Heart sounds: Normal heart sounds.   Pulmonary:      Effort: Pulmonary effort is normal. No respiratory distress.      Breath sounds: Normal breath sounds. No wheezing, rhonchi or rales.   Neurological:      General: No focal deficit present.      Mental Status: She is alert and oriented to person, place, and " time.   Psychiatric:         Mood and Affect: Mood normal.         Behavior: Behavior normal.           Diagnoses and all orders for this visit:    1. Acute URI (Primary)  -     POCT SARS-CoV-2 Antigen EDUARDO + Flu: All negative  -     benzonatate (Tessalon Perles) 100 MG capsule; Take 2 capsules by mouth 3 (Three) Times a Day As Needed for Cough.  Dispense: 42 capsule; Refill: 0  -     ondansetron ODT (ZOFRAN-ODT) 4 MG disintegrating tablet; Place 1 tablet on the tongue Every 8 (Eight) Hours As Needed for Nausea.  Dispense: 30 tablet; Refill: 0  -     azithromycin (Zithromax Z-Corby) 250 MG tablet; Take 2 tablets by mouth on day 1, then 1 tablet daily on days 2-5  Dispense: 6 tablet; Refill: 0  -Added over-the-counter measures to try according to symptom via after visit summary    2. Vulvovaginal candidiasis  -     fluconazole (DIFLUCAN) 150 MG tablet; Take 1 tablet by mouth 1 (One) Time for 1 dose.  Dispense: 1 tablet; Refill: 0    3. Overweight (BMI 25.0-29.9)   BMI is >= 25 and <30. (Overweight) The following options were offered after discussion;: exercise counseling/recommendations and nutrition counseling/recommendations          Follow Up  - prn    Expected course, medications, and adverse effects discussed as appropriate.  Call or return if worsening or persistent symptoms. Hand hygiene was performed before donning protective equipment and after removal when leaving the room.    This document is intended for medical expert use only. Reading of this document by patients and/or patient's family without participating medical staff guidance may result in misinterpretation and unintended morbidity. Any interpretation of such data is the responsibility of the patient and/or family member responsible for the patient in concert with their primary or specialist providers, not to be left for sources of online searches such as [employer], [employer] or similar queries. Relying on these approaches to knowledge may result in  misinterpretation, misguided goals of care and even death should patients or family members try recommendations outside of the realm of professional medical care.

## 2025-01-03 ENCOUNTER — OFFICE VISIT (OUTPATIENT)
Dept: FAMILY MEDICINE CLINIC | Facility: CLINIC | Age: 35
End: 2025-01-03
Payer: OTHER GOVERNMENT

## 2025-01-03 VITALS
BODY MASS INDEX: 27.49 KG/M2 | DIASTOLIC BLOOD PRESSURE: 68 MMHG | RESPIRATION RATE: 18 BRPM | SYSTOLIC BLOOD PRESSURE: 92 MMHG | TEMPERATURE: 97.1 F | HEIGHT: 64 IN | WEIGHT: 161 LBS | OXYGEN SATURATION: 98 % | HEART RATE: 111 BPM

## 2025-01-03 DIAGNOSIS — E66.3 OVERWEIGHT (BMI 25.0-29.9): ICD-10-CM

## 2025-01-03 DIAGNOSIS — J06.9 ACUTE URI: Primary | ICD-10-CM

## 2025-01-03 DIAGNOSIS — B37.31 VULVOVAGINAL CANDIDIASIS: ICD-10-CM

## 2025-01-03 PROCEDURE — 99213 OFFICE O/P EST LOW 20 MIN: CPT | Performed by: STUDENT IN AN ORGANIZED HEALTH CARE EDUCATION/TRAINING PROGRAM

## 2025-01-03 PROCEDURE — 87428 SARSCOV & INF VIR A&B AG IA: CPT | Performed by: STUDENT IN AN ORGANIZED HEALTH CARE EDUCATION/TRAINING PROGRAM

## 2025-01-03 RX ORDER — ONDANSETRON 4 MG/1
4 TABLET, ORALLY DISINTEGRATING ORAL EVERY 8 HOURS PRN
Qty: 30 TABLET | Refills: 0 | Status: SHIPPED | OUTPATIENT
Start: 2025-01-03

## 2025-01-03 RX ORDER — AZITHROMYCIN 250 MG/1
TABLET, FILM COATED ORAL
Qty: 6 TABLET | Refills: 0 | Status: SHIPPED | OUTPATIENT
Start: 2025-01-03

## 2025-01-03 RX ORDER — FLUCONAZOLE 150 MG/1
150 TABLET ORAL ONCE
Qty: 1 TABLET | Refills: 0 | Status: SHIPPED | OUTPATIENT
Start: 2025-01-03 | End: 2025-01-03

## 2025-01-03 RX ORDER — BENZONATATE 100 MG/1
200 CAPSULE ORAL 3 TIMES DAILY PRN
Qty: 42 CAPSULE | Refills: 0 | Status: SHIPPED | OUTPATIENT
Start: 2025-01-03

## 2025-01-03 NOTE — PATIENT INSTRUCTIONS
- daily flonase and over the counter antihistamine (Zyrtec/Cetirizine, non-drowsy allegra/Fexofenadine, claratin/loratadine) to help with nasal congestion and runny nose  - as needed pseudoephedrine for nasal congestion. Phenylephrine can also be used in its stead. Can use coricidin instead if you have high blood pressure  - OTC cough syrups to manage cough (dextromethorphan is the active ingredient)  - mucinex for chest congestion and/or thick mucus  - chloraseptic throat sprays for sore throat (over the counter), cough drops, hot tea with honey  - alternating tylenol and ibuprofen for body aches and headaches      Standardized Exercise Recommendations  - Work up to 150 minutes of aerobic, moderate intensity (you can talk but you can't sing) or 75 minutes of vigorous activity of dedicated exercise a week  - 2 days a week, include resistance/weight training    Light intensity   - Ex: casual walking, light housework, stretching  Moderate Intensity   - brisk walking, water aerobics, ballroom dancing   - breathing will be a little harder with a faster heartbeat  Vigorous Intensity   - jogging/running, aerobic dancing    What are the benefits of movement?  Moving your body has many benefits. It can:  ?Burn calories, which helps people control their weight  ?Help control blood sugar levels in people with diabetes  ?Lower blood pressure, especially in people with high blood pressure  ?Lower stress and help with depression and anxiety  ?Keep bones strong, so they don't get thin and break easily  ?Lower the chance of dying from heart disease  Adding even small amounts of physical activity to your daily routine can improve your health.    What are the main types of exercise?  There are 3 main types of exercise. They are:  ?Aerobic exercise - Aerobic exercise raises a person's heart rate. Examples of aerobic exercise are walking, running, dancing, riding a bike, or swimming.  ?Resistance training - Resistance training  helps make your muscles stronger. People can do this type of exercise using weights, exercise bands, or weight machines. You can also do this type of exercise using your own body weight, as with push-ups, or by lifting items in your home, like jugs of water.   ?Stretching - Stretching exercises help your muscles and joints move more easily.  It's important to have all 3 types of exercise in your exercise program. That way, your body, muscles, and joints can be as healthy as possible.    Should I talk to my doctor or nurse before I start exercising?  If you have not exercised before or have not exercised in a long time, talk with your doctor or nurse before you start a very active exercise program.  If you have heart disease or risk factors for heart disease (like high blood pressure or diabetes), your doctor or nurse might recommend that you have an exercise test before starting an exercise program.  When you start an exercise program, start slowly. For example, do the exercise at a slow pace or for a few minutes only. Over time, you can exercise faster and for longer periods of time.  What should I do when I exercise? -- Each time you exercise, you should:  ?Warm up - Warming up can help keep you from hurting your muscles when you exercise. To warm up, do a light aerobic exercise (such as walking slowly) or stretch for 5 to 10 minutes.  ?Work out - You should try to get a mix of aerobic exercise, resistance training, and stretching. During an aerobic workout, you can walk fast, swim, run, or use an exercise machine, for example. Other activities, like dancing or playing tennis, are also forms of aerobic exercise. You should also take time to stretch all of your joints, including your neck, shoulders, back, hips, and knees. At least 2 times a week, you can do resistance training exercises as part of your workout.  ?Cool down - Cooling down helps keep you from feeling dizzy after you exercise and helps prevent muscle  cramps. To cool down, you can stretch or do a light aerobic exercise for 5 minutes.  Some people go to a gym or do group exercise classes. But you can exercise even without these things. Some exercises can be done even in a small space. You can also try online videos or smartphone apps to get ideas for different types of exercise.     How often should I exercise?   Doctors recommend that people exercise at least 30 minutes a day, on 5 or more days of the week.  If you can't exercise for 30 minutes straight, try to exercise for 10 minutes at a time, 3 or 4 times a day. Even exercising for shorter amounts of time is good for you, especially if it means spending less time sitting.   When should I call my doctor or nurse? -- If you have any of the following symptoms when you exercise, stop exercising and call your doctor or nurse right away:  ?Pain or pressure in your chest, arms, throat, jaw, or back  ?Nausea or vomiting  ?Feeling like your heart is fluttering or racing very fast  ?Feeling dizzy or faint    What if I don't have time to exercise?   Many people have very busy lives and might not think that they have time to exercise. But it's important to try to find time to exercise, even if you are tired or work a lot. Exercise can increase your energy level, which can make you feel better and might even help you get more work done.  Even if it's hard to set aside a lot of time to exercise, you can still improve your health by moving your body more. There are many ways that you can be more active. For example, you can:  ?Take the stairs instead of the elevator  ?Park in a parking space that is farther away from the door  ?Take a longer route when you walk from one place to another  Spending a lot of time sitting still - for example, watching television or working on the computer - can be bad for your health. Try to get up and move around whenever you can. Even small amounts of movement, like taking short walks, doing  household chores, or gardening, can help improve your health. Finding activities you enjoy, or doing them with other people, can help you add more movement into your daily life.    What else should I do when I exercise?   To exercise safely and avoid problems, it's important to:  ?Drink fluids during and after exercising (but avoid drinks with a lot of caffeine or sugar)  ?Avoid exercising outside if it is too hot or cold  ?Wear layers of clothes, so that you can take them off if you get too hot  ?Wear shoes that fit well and support your feet  ?Be aware of your surroundings if you exercise outside

## 2025-01-13 ENCOUNTER — TELEPHONE (OUTPATIENT)
Dept: FAMILY MEDICINE CLINIC | Facility: CLINIC | Age: 35
End: 2025-01-13

## 2025-01-13 ENCOUNTER — OFFICE VISIT (OUTPATIENT)
Dept: FAMILY MEDICINE CLINIC | Facility: CLINIC | Age: 35
End: 2025-01-13
Payer: OTHER GOVERNMENT

## 2025-01-13 ENCOUNTER — HOSPITAL ENCOUNTER (OUTPATIENT)
Dept: GENERAL RADIOLOGY | Facility: HOSPITAL | Age: 35
Discharge: HOME | End: 2025-01-13
Admitting: FAMILY MEDICINE
Payer: OTHER GOVERNMENT

## 2025-01-13 VITALS
WEIGHT: 165.2 LBS | OXYGEN SATURATION: 98 % | RESPIRATION RATE: 18 BRPM | SYSTOLIC BLOOD PRESSURE: 110 MMHG | HEART RATE: 116 BPM | TEMPERATURE: 97.8 F | HEIGHT: 64 IN | DIASTOLIC BLOOD PRESSURE: 74 MMHG | BODY MASS INDEX: 28.2 KG/M2

## 2025-01-13 DIAGNOSIS — E66.3 OVERWEIGHT (BMI 25.0-29.9): Primary | ICD-10-CM

## 2025-01-13 DIAGNOSIS — J20.8 ACUTE BACTERIAL BRONCHITIS: ICD-10-CM

## 2025-01-13 DIAGNOSIS — R05.1 ACUTE COUGH: ICD-10-CM

## 2025-01-13 DIAGNOSIS — B96.89 ACUTE BACTERIAL BRONCHITIS: ICD-10-CM

## 2025-01-13 PROCEDURE — 99213 OFFICE O/P EST LOW 20 MIN: CPT | Performed by: FAMILY MEDICINE

## 2025-01-13 PROCEDURE — 71046 X-RAY EXAM CHEST 2 VIEWS: CPT

## 2025-01-13 RX ORDER — FLUCONAZOLE 150 MG/1
150 TABLET ORAL
COMMUNITY
Start: 2025-01-03

## 2025-01-13 RX ORDER — PREDNISONE 5 MG/1
5 TABLET ORAL DAILY
Qty: 45 TABLET | Refills: 0 | Status: SHIPPED | OUTPATIENT
Start: 2025-01-13

## 2025-01-13 RX ORDER — DOXYCYCLINE 100 MG/1
100 CAPSULE ORAL 2 TIMES DAILY
Qty: 20 CAPSULE | Refills: 0 | Status: SHIPPED | OUTPATIENT
Start: 2025-01-13

## 2025-01-13 NOTE — TELEPHONE ENCOUNTER
Caller: Evangelist Mcdonald    Relationship: Self    Best call back number: [phone]    Who are you requesting to speak with (clinical staff, provider,  specific staff member): CLINICAL STAFF    What was the call regarding: STATED THAT THEY WOULD LIKE TO KNOW WHAT DR. NUNEZ RECOMMENDS THEY DO FOR THE COUGH THEY WERE SEEN AND GIVEN MEDICATION FOR BY DR. WHITAKER ON 1/3/25. STATED THAT THE COUGH HAS NOT IMPROVED AND HAS ACTUALLY FELT LIKE IT HAS GOTTEN WORSE. PLEASE CALL AND ADVISE

## 2025-01-13 NOTE — PROGRESS NOTES
Subjective   Evangelist Mcdonald is a 34 y.o. female. Presents to [facility]    Chief Complaint   Patient presents with    URI       History of Present Illness  Patient was seen by Dr. Pollard on 01/03/25 for URI Sx. Flu and covid test was negative. She was prescribed Azithromycin, benzonatate, fluconazole and ondansetron. She is here to follow up.   URI   This is a new problem. The current episode started 1 to 4 weeks ago. The problem has been gradually worsening. There has been no fever. Associated symptoms include congestion, coughing, ear pain, headaches, nausea, a plugged ear sensation, rhinorrhea, sinus pain, sneezing and a sore throat. Pertinent negatives include no abdominal pain, vomiting or wheezing. Treatments tried: Azithromycin, benzonatate, fluconazole and ondansetron. The treatment provided mild relief.      Started around alberto. She was seen and tested negative for flu and covid.     I personally reviewed and updated the patient's allergies, medications, problem list, and past medical, surgical, social, and family history. I have reviewed and confirmed the accuracy of the History of Present Illness and Review of Symptoms as documented by the MA/LPN/RN. Tanja Caro MD    Allergies:  Allergies   Allergen Reactions    Ceftriaxone Hives, Itching, Swelling and Rash     Beta lactam allergy details  Antibiotic reaction: hives, rash, swelling  Age at reaction: unknown  Dose to reaction time: unknown  Reason for antibiotic: unknown  Epinephrine required for reaction?: unknown  Tolerated antibiotics: unknown       Sulfa Antibiotics Hives    Ciprofloxacin Hives       Social History:  Social History     Socioeconomic History    Marital status:    Tobacco Use    Smoking status: Never     Passive exposure: Never    Smokeless tobacco: Never   Vaping Use    Vaping status: Never Used   Substance and Sexual Activity    Alcohol use: No    Drug use: No    Sexual activity: Defer       Family  History:  Family History   Problem Relation Age of Onset    Diabetes Maternal Grandfather         mellitus    Heart disease Maternal Grandfather         ischemic    Cancer Paternal Grandfather         ?    Heart disease Paternal Grandfather         ischemic    Bipolar disorder Cousin        Past Medical History :  Patient Active Problem List   Diagnosis    Generalized anxiety disorder    Elevated C-reactive protein    Fibrocystic breast disease    History of varicella    Other bladder disorder    Other specific developmental learning difficulties    Seasonal allergic rhinitis due to pollen    Vesicoureteral reflux    Miliaria rubra    Borderline open-angle glaucoma    Pruritus    GERD (gastroesophageal reflux disease)    Alternating constipation and diarrhea    Herpes simplex of female genitalia    Encounter for general adult medical examination with abnormal findings    Screening for hyperlipidemia    POTS (postural orthostatic tachycardia syndrome)    Chronic interstitial cystitis    Need for Tdap vaccination    Overweight (BMI 25.0-29.9)    Vitamin D deficiency    Lateral epicondylitis of right elbow    Acute bacterial bronchitis       Medication List:    Current Outpatient Medications:     benzonatate (Tessalon Perles) 100 MG capsule, Take 2 capsules by mouth 3 (Three) Times a Day As Needed for Cough., Disp: 42 capsule, Rfl: 0    fluconazole (DIFLUCAN) 150 MG tablet, Take 1 tablet by mouth., Disp: , Rfl:     ondansetron ODT (ZOFRAN-ODT) 4 MG disintegrating tablet, Place 1 tablet on the tongue Every 8 (Eight) Hours As Needed for Nausea., Disp: 30 tablet, Rfl: 0    Prenatal Vit-DSS-Fe Cbn-FA (PRENATAL AD PO), Take  by mouth., Disp: , Rfl:     valACYclovir (VALTREX) 500 MG tablet, Take 1 tablet by mouth Daily. (Patient taking differently: Take 1 tablet by mouth Daily As Needed.), Disp: 30 tablet, Rfl: 12    doxycycline (MONODOX) 100 MG capsule, Take 1 capsule by mouth 2 (Two) Times a Day., Disp: 20 capsule, Rfl:  "0    predniSONE (DELTASONE) 5 MG tablet, Take 1 tablet by mouth Daily. 40mg x 3 days, 20mg x 3 days, 10mg x 3 days, 5mg x 3 days, Disp: 45 tablet, Rfl: 0    Past Surgical History:  Past Surgical History:   Procedure Laterality Date    CHOLECYSTECTOMY N/A 8/29/2024    Procedure: Robotic assisted laparoscopic cholecystectomy;  Surgeon: Herlinda Bella MD;  Location: [facility] MAIN OR;  Service: Robotics - [facility];  Laterality: N/A;         Physical Exam:      Vital Signs:    Vitals:    01/13/25 1508   BP: 110/74   Pulse: 116   Resp: 18   Temp: 97.8 °F (36.6 °C)   SpO2: 98%        /74 (BP Location: Right arm, Patient Position: Sitting, Cuff Size: Adult)   Pulse 116   Temp 97.8 °F (36.6 °C) (Temporal)   Resp 18   Ht 162.6 cm (64\")   Wt 74.9 kg (165 lb 3.2 oz)   LMP 12/27/2024 (Exact Date)   SpO2 98% Comment: ra  BMI 28.36 kg/m²     Wt Readings from Last 3 Encounters:   01/13/25 74.9 kg (165 lb 3.2 oz)   01/03/25 73 kg (161 lb)   11/11/24 73.4 kg (161 lb 12.8 oz)       Result Review :   The following data was reviewed by: Tanja Caro MD on 01/13/2025:            Latest Reference Range & Units 01/03/25 08:25   SARS Antigen Not Detected, Presumptive Negative  Not Detected   Expiration Date  01/01/2026   Lot Number  [id number]   Influenza A Antigen EDUARDO Not Detected  Not Detected   Influenza B Antigen EDUARDO Not Detected  Not Detected       Physical Exam  Vitals reviewed.   Constitutional:       Appearance: Normal appearance. She is well-developed.   HENT:      Head: Normocephalic and atraumatic.      Right Ear: Tympanic membrane and external ear normal. No decreased hearing noted. No tenderness. No middle ear effusion. Tympanic membrane is not injected, erythematous, retracted or bulging.      Left Ear: Tympanic membrane and external ear normal. No decreased hearing noted. No tenderness.  No middle ear effusion. Tympanic membrane is not injected, erythematous, retracted or bulging.      Nose: Nose normal. No " rhinorrhea.      Mouth/Throat:      Pharynx: No oropharyngeal exudate or posterior oropharyngeal erythema.   Eyes:      General:         Right eye: No discharge.         Left eye: No discharge.   Cardiovascular:      Rate and Rhythm: Normal rate and regular rhythm.      Heart sounds: Normal heart sounds. No murmur heard.     No friction rub. No gallop.   Pulmonary:      Effort: Pulmonary effort is normal. No respiratory distress.      Breath sounds: Normal breath sounds. No wheezing or rales.   Lymphadenopathy:      Cervical: No cervical adenopathy.   Skin:     General: Skin is warm and dry.      Findings: No rash.   Neurological:      Mental Status: She is alert and oriented to person, place, and time.      Coordination: Coordination normal.      Gait: Gait normal.   Psychiatric:         Behavior: Behavior is cooperative.         Assessment and Plan:  Problems Addressed this Visit          Endocrine and Metabolic    Overweight (BMI 25.0-29.9) - Primary       Pulmonary and Pneumonias    Acute bacterial bronchitis     With some reactive component    increase fluids, tylenol for fever, motrin for pain. Humidifier to help with congestion and to sleep at night. Dicussed OTC meds, gargle with warm salt water. If there is recurrent fever, shortness of breath, lethargy, advised to come in to the office or go to the ER.    Diagnosis, treatment and and course discussed. Potential side effects discussed. Return if there is worsening or persistence of symptoms.     Discussed risks of steroids: hyperglycemia, osteoporosis, avascular necrosis, anxiety, insomnia and cataracts. Patient states understanding           Relevant Medications    doxycycline (MONODOX) 100 MG capsule     Other Visit Diagnoses       Acute cough        Relevant Medications    doxycycline (MONODOX) 100 MG capsule    predniSONE (DELTASONE) 5 MG tablet    Other Relevant Orders    XR Chest PA & Lateral (Completed)          Diagnoses         Codes Comments     Overweight (BMI 25.0-29.9)    -  Primary ICD-10-CM: E66.3  ICD-9-CM: 278.02     Acute cough     ICD-10-CM: R05.1  ICD-9-CM: 786.2     Acute bacterial bronchitis     ICD-10-CM: J20.8, B96.89  ICD-9-CM: 466.0, 041.9                          An After Visit Summary and PPPS were given to the patient.       This document is intended for medical expert use only. Reading of this document by patients and/or patient's family without participating medical staff guidance may result in misinterpretation and unintended morbidity. Any interpretation of such data is the responsibility of the patient and/or family member responsible for the patient in concert with their primary or specialist providers, not to be left for sources of online searches such as [employer], [employer] or similar queries. Relying on these approaches to knowledge may result in misinterpretation, misguided goals of care and even death should patients or family members try recommendations outside of the realm of professional medical care.

## 2025-01-21 PROBLEM — B96.89 ACUTE BACTERIAL BRONCHITIS: Status: ACTIVE | Noted: 2025-01-21

## 2025-01-21 PROBLEM — J20.8 ACUTE BACTERIAL BRONCHITIS: Status: ACTIVE | Noted: 2025-01-21

## 2025-01-22 NOTE — ASSESSMENT & PLAN NOTE
With some reactive component    increase fluids, tylenol for fever, motrin for pain. Humidifier to help with congestion and to sleep at night. Dicussed OTC meds, gargle with warm salt water. If there is recurrent fever, shortness of breath, lethargy, advised to come in to the office or go to the ER.    Diagnosis, treatment and and course discussed. Potential side effects discussed. Return if there is worsening or persistence of symptoms.     Discussed risks of steroids: hyperglycemia, osteoporosis, avascular necrosis, anxiety, insomnia and cataracts. Patient states understanding

## 2025-03-31 ENCOUNTER — TELEPHONE (OUTPATIENT)
Dept: FAMILY MEDICINE CLINIC | Facility: CLINIC | Age: 35
End: 2025-03-31
Payer: OTHER GOVERNMENT

## (undated) DEVICE — BG RETRV TISS SUPERBAG INTRO RIP/STOP NLY 5/7MM 140ML MD

## (undated) DEVICE — PAD, GROUNDING, UNIVERSAL, SPLIT, 9': Brand: MEDLINE

## (undated) DEVICE — TBG INSUFF MALE L/L W 12MM CON: Brand: MEDLINE INDUSTRIES, INC.

## (undated) DEVICE — SEAL

## (undated) DEVICE — ENDOPATH PNEUMONEEDLE INSUFFLATION NEEDLES WITH LUER LOCK CONNECTORS 120MM: Brand: ENDOPATH

## (undated) DEVICE — GLV SURG SENSICARE POLYISPRN W/ALOE PF LF 6.5 GRN STRL

## (undated) DEVICE — GAUZE,SPONGE,4"X4",16PLY,XRAY,STRL,LF: Brand: MEDLINE

## (undated) DEVICE — GLOVE,SURG,SENSICARE SLT,LF,PF,6: Brand: MEDLINE

## (undated) DEVICE — PASS SUT PRO BARIATRIC XL W/TROC SWABS

## (undated) DEVICE — ENDOPATH XCEL BLADELESS TROCARS WITH STABILITY SLEEVES: Brand: ENDOPATH XCEL

## (undated) DEVICE — SUT VIC 0 UR6 27IN VCP603H

## (undated) DEVICE — BLANKT WARM UPPR/BDY ARM/OUT 57X196CM

## (undated) DEVICE — LAPAROVUE VISIBILITY SYSTEM LAPAROSCOPIC SOLUTIONS: Brand: LAPAROVUE

## (undated) DEVICE — KT SURG TURNOVER 050

## (undated) DEVICE — SYR LUERLOK 30CC

## (undated) DEVICE — ANTIBACTERIAL UNDYED BRAIDED (POLYGLACTIN 910), SYNTHETIC ABSORBABLE SUTURE: Brand: COATED VICRYL

## (undated) DEVICE — ARM DRAPE

## (undated) DEVICE — COLUMN DRAPE

## (undated) DEVICE — 3M™ IOBAN™ 2 ANTIMICROBIAL INCISE DRAPE 6650EZ: Brand: IOBAN™ 2

## (undated) DEVICE — GENERAL LAPAROSCOPY CDS: Brand: MEDLINE INDUSTRIES, INC.

## (undated) DEVICE — BLADELESS OBTURATOR: Brand: WECK VISTA